# Patient Record
Sex: FEMALE | Race: WHITE | NOT HISPANIC OR LATINO | Employment: FULL TIME | URBAN - METROPOLITAN AREA
[De-identification: names, ages, dates, MRNs, and addresses within clinical notes are randomized per-mention and may not be internally consistent; named-entity substitution may affect disease eponyms.]

---

## 2017-01-11 ENCOUNTER — ALLSCRIPTS OFFICE VISIT (OUTPATIENT)
Dept: OTHER | Facility: OTHER | Age: 57
End: 2017-01-11

## 2017-01-17 ENCOUNTER — ALLSCRIPTS OFFICE VISIT (OUTPATIENT)
Dept: OTHER | Facility: OTHER | Age: 57
End: 2017-01-17

## 2017-02-13 ENCOUNTER — GENERIC CONVERSION - ENCOUNTER (OUTPATIENT)
Dept: OTHER | Facility: OTHER | Age: 57
End: 2017-02-13

## 2017-04-18 ENCOUNTER — ALLSCRIPTS OFFICE VISIT (OUTPATIENT)
Dept: OTHER | Facility: OTHER | Age: 57
End: 2017-04-18

## 2017-05-12 ENCOUNTER — ANESTHESIA (OUTPATIENT)
Dept: GASTROENTEROLOGY | Facility: HOSPITAL | Age: 57
End: 2017-05-12
Payer: COMMERCIAL

## 2017-05-12 ENCOUNTER — HOSPITAL ENCOUNTER (OUTPATIENT)
Facility: HOSPITAL | Age: 57
Setting detail: OUTPATIENT SURGERY
Discharge: HOME | End: 2017-05-12
Attending: INTERNAL MEDICINE | Admitting: INTERNAL MEDICINE
Payer: COMMERCIAL

## 2017-05-12 ENCOUNTER — ANESTHESIA EVENT (OUTPATIENT)
Dept: GASTROENTEROLOGY | Facility: HOSPITAL | Age: 57
End: 2017-05-12
Payer: COMMERCIAL

## 2017-05-12 ENCOUNTER — GENERIC CONVERSION - ENCOUNTER (OUTPATIENT)
Dept: OTHER | Facility: OTHER | Age: 57
End: 2017-05-12

## 2017-05-12 VITALS
WEIGHT: 180 LBS | HEART RATE: 79 BPM | BODY MASS INDEX: 33.13 KG/M2 | SYSTOLIC BLOOD PRESSURE: 126 MMHG | HEIGHT: 62 IN | DIASTOLIC BLOOD PRESSURE: 65 MMHG | RESPIRATION RATE: 16 BRPM | OXYGEN SATURATION: 100 % | TEMPERATURE: 98.1 F

## 2017-05-12 DIAGNOSIS — Z12.12 ENCOUNTER FOR SCREENING FOR MALIGNANT NEOPLASM OF RECTUM: ICD-10-CM

## 2017-05-12 DIAGNOSIS — Z12.11 ENCOUNTER FOR SCREENING FOR MALIGNANT NEOPLASM OF COLON: ICD-10-CM

## 2017-05-12 PROCEDURE — 88305 TISSUE EXAM BY PATHOLOGIST: CPT | Performed by: INTERNAL MEDICINE

## 2017-05-12 RX ORDER — LEVOTHYROXINE SODIUM 175 UG/1
175 TABLET ORAL DAILY
COMMUNITY
End: 2018-02-13 | Stop reason: SDUPTHER

## 2017-05-12 RX ORDER — LIDOCAINE HYDROCHLORIDE 10 MG/ML
INJECTION, SOLUTION INFILTRATION; PERINEURAL AS NEEDED
Status: DISCONTINUED | OUTPATIENT
Start: 2017-05-12 | End: 2017-05-12 | Stop reason: SURG

## 2017-05-12 RX ORDER — SODIUM CHLORIDE 9 MG/ML
INJECTION, SOLUTION INTRAVENOUS CONTINUOUS PRN
Status: DISCONTINUED | OUTPATIENT
Start: 2017-05-12 | End: 2017-05-12 | Stop reason: SURG

## 2017-05-12 RX ORDER — PAROXETINE 7.5 MG/1
CAPSULE ORAL
COMMUNITY
End: 2018-12-21 | Stop reason: ALTCHOICE

## 2017-05-12 RX ORDER — PROPOFOL 10 MG/ML
INJECTION, EMULSION INTRAVENOUS CONTINUOUS PRN
Status: DISCONTINUED | OUTPATIENT
Start: 2017-05-12 | End: 2017-05-12 | Stop reason: SURG

## 2017-05-12 RX ORDER — PROPOFOL 10 MG/ML
INJECTION, EMULSION INTRAVENOUS AS NEEDED
Status: DISCONTINUED | OUTPATIENT
Start: 2017-05-12 | End: 2017-05-12 | Stop reason: SURG

## 2017-05-12 RX ORDER — SODIUM CHLORIDE 9 MG/ML
50 INJECTION, SOLUTION INTRAVENOUS CONTINUOUS
Status: DISCONTINUED | OUTPATIENT
Start: 2017-05-12 | End: 2017-05-12 | Stop reason: HOSPADM

## 2017-05-12 RX ADMIN — PROPOFOL 100 MCG/KG/MIN: 10 INJECTION, EMULSION INTRAVENOUS at 08:07

## 2017-05-12 RX ADMIN — PROPOFOL 80 MG: 10 INJECTION, EMULSION INTRAVENOUS at 08:07

## 2017-05-12 RX ADMIN — SODIUM CHLORIDE: 0.9 INJECTION, SOLUTION INTRAVENOUS at 07:53

## 2017-05-12 RX ADMIN — LIDOCAINE HYDROCHLORIDE 50 MG: 10 INJECTION, SOLUTION INFILTRATION; PERINEURAL at 08:07

## 2017-06-03 ENCOUNTER — GENERIC CONVERSION - ENCOUNTER (OUTPATIENT)
Dept: OTHER | Facility: OTHER | Age: 57
End: 2017-06-03

## 2017-08-08 ENCOUNTER — APPOINTMENT (OUTPATIENT)
Dept: RADIOLOGY | Age: 57
End: 2017-08-08
Attending: PHYSICIAN ASSISTANT
Payer: OTHER MISCELLANEOUS

## 2017-08-08 ENCOUNTER — TRANSCRIBE ORDERS (OUTPATIENT)
Dept: URGENT CARE | Age: 57
End: 2017-08-08

## 2017-08-08 ENCOUNTER — APPOINTMENT (OUTPATIENT)
Dept: URGENT CARE | Age: 57
End: 2017-08-08
Payer: OTHER MISCELLANEOUS

## 2017-08-08 DIAGNOSIS — T14.90XA INJURY: ICD-10-CM

## 2017-08-08 DIAGNOSIS — T14.90XA INJURY: Primary | ICD-10-CM

## 2017-08-08 PROCEDURE — G0383 LEV 4 HOSP TYPE B ED VISIT: HCPCS | Performed by: PREVENTIVE MEDICINE

## 2017-08-08 PROCEDURE — 73030 X-RAY EXAM OF SHOULDER: CPT

## 2017-08-08 PROCEDURE — 99284 EMERGENCY DEPT VISIT MOD MDM: CPT | Performed by: PREVENTIVE MEDICINE

## 2017-08-11 ENCOUNTER — APPOINTMENT (OUTPATIENT)
Dept: URGENT CARE | Age: 57
End: 2017-08-11
Payer: OTHER MISCELLANEOUS

## 2017-08-11 PROCEDURE — 99213 OFFICE O/P EST LOW 20 MIN: CPT | Performed by: PREVENTIVE MEDICINE

## 2017-08-18 ENCOUNTER — APPOINTMENT (OUTPATIENT)
Dept: URGENT CARE | Age: 57
End: 2017-08-18
Payer: OTHER MISCELLANEOUS

## 2017-08-18 PROCEDURE — 99213 OFFICE O/P EST LOW 20 MIN: CPT | Performed by: PREVENTIVE MEDICINE

## 2017-09-15 ENCOUNTER — APPOINTMENT (OUTPATIENT)
Dept: PHYSICAL THERAPY | Facility: CLINIC | Age: 57
End: 2017-09-15
Payer: COMMERCIAL

## 2017-09-15 PROCEDURE — G8990 OTHER PT/OT CURRENT STATUS: HCPCS

## 2017-09-15 PROCEDURE — G8991 OTHER PT/OT GOAL STATUS: HCPCS

## 2017-09-15 PROCEDURE — 97162 PT EVAL MOD COMPLEX 30 MIN: CPT

## 2017-09-18 ENCOUNTER — APPOINTMENT (OUTPATIENT)
Dept: PHYSICAL THERAPY | Facility: CLINIC | Age: 57
End: 2017-09-18
Payer: COMMERCIAL

## 2017-09-18 PROCEDURE — 97140 MANUAL THERAPY 1/> REGIONS: CPT

## 2017-09-21 ENCOUNTER — APPOINTMENT (OUTPATIENT)
Dept: PHYSICAL THERAPY | Facility: CLINIC | Age: 57
End: 2017-09-21
Payer: COMMERCIAL

## 2017-09-21 PROCEDURE — 97140 MANUAL THERAPY 1/> REGIONS: CPT

## 2017-09-26 ENCOUNTER — APPOINTMENT (OUTPATIENT)
Dept: PHYSICAL THERAPY | Facility: CLINIC | Age: 57
End: 2017-09-26
Payer: COMMERCIAL

## 2017-09-26 PROCEDURE — 97014 ELECTRIC STIMULATION THERAPY: CPT

## 2017-09-26 PROCEDURE — 97140 MANUAL THERAPY 1/> REGIONS: CPT

## 2017-09-28 ENCOUNTER — APPOINTMENT (OUTPATIENT)
Dept: PHYSICAL THERAPY | Facility: CLINIC | Age: 57
End: 2017-09-28
Payer: COMMERCIAL

## 2017-09-28 PROCEDURE — 97010 HOT OR COLD PACKS THERAPY: CPT

## 2017-09-28 PROCEDURE — 97140 MANUAL THERAPY 1/> REGIONS: CPT

## 2017-10-03 ENCOUNTER — APPOINTMENT (OUTPATIENT)
Dept: PHYSICAL THERAPY | Facility: CLINIC | Age: 57
End: 2017-10-03
Payer: COMMERCIAL

## 2017-10-03 PROCEDURE — 97140 MANUAL THERAPY 1/> REGIONS: CPT

## 2017-10-05 ENCOUNTER — APPOINTMENT (OUTPATIENT)
Dept: PHYSICAL THERAPY | Facility: CLINIC | Age: 57
End: 2017-10-05
Payer: COMMERCIAL

## 2017-10-05 PROCEDURE — 97140 MANUAL THERAPY 1/> REGIONS: CPT

## 2017-10-11 ENCOUNTER — APPOINTMENT (OUTPATIENT)
Dept: PHYSICAL THERAPY | Facility: CLINIC | Age: 57
End: 2017-10-11
Payer: COMMERCIAL

## 2017-10-11 PROCEDURE — 97140 MANUAL THERAPY 1/> REGIONS: CPT

## 2017-10-13 ENCOUNTER — APPOINTMENT (OUTPATIENT)
Dept: PHYSICAL THERAPY | Facility: CLINIC | Age: 57
End: 2017-10-13
Payer: COMMERCIAL

## 2017-10-13 PROCEDURE — 97110 THERAPEUTIC EXERCISES: CPT

## 2017-10-13 PROCEDURE — 97140 MANUAL THERAPY 1/> REGIONS: CPT

## 2017-10-16 ENCOUNTER — APPOINTMENT (OUTPATIENT)
Dept: PHYSICAL THERAPY | Facility: CLINIC | Age: 57
End: 2017-10-16
Payer: COMMERCIAL

## 2017-10-16 PROCEDURE — 97110 THERAPEUTIC EXERCISES: CPT

## 2017-10-16 PROCEDURE — G8990 OTHER PT/OT CURRENT STATUS: HCPCS

## 2017-10-16 PROCEDURE — 97140 MANUAL THERAPY 1/> REGIONS: CPT

## 2017-10-16 PROCEDURE — G8991 OTHER PT/OT GOAL STATUS: HCPCS

## 2017-10-19 ENCOUNTER — APPOINTMENT (OUTPATIENT)
Dept: PHYSICAL THERAPY | Facility: CLINIC | Age: 57
End: 2017-10-19
Payer: COMMERCIAL

## 2017-10-19 PROCEDURE — 97110 THERAPEUTIC EXERCISES: CPT

## 2017-10-19 PROCEDURE — 97140 MANUAL THERAPY 1/> REGIONS: CPT

## 2017-10-23 ENCOUNTER — APPOINTMENT (OUTPATIENT)
Dept: PHYSICAL THERAPY | Facility: CLINIC | Age: 57
End: 2017-10-23
Payer: COMMERCIAL

## 2017-10-23 PROCEDURE — 97140 MANUAL THERAPY 1/> REGIONS: CPT

## 2017-10-23 PROCEDURE — 97110 THERAPEUTIC EXERCISES: CPT

## 2017-10-26 ENCOUNTER — APPOINTMENT (OUTPATIENT)
Dept: PHYSICAL THERAPY | Facility: CLINIC | Age: 57
End: 2017-10-26
Payer: COMMERCIAL

## 2017-10-26 PROCEDURE — 97140 MANUAL THERAPY 1/> REGIONS: CPT

## 2017-10-26 PROCEDURE — 97110 THERAPEUTIC EXERCISES: CPT

## 2017-10-31 ENCOUNTER — APPOINTMENT (OUTPATIENT)
Dept: PHYSICAL THERAPY | Facility: CLINIC | Age: 57
End: 2017-10-31
Payer: COMMERCIAL

## 2017-10-31 PROCEDURE — 97110 THERAPEUTIC EXERCISES: CPT

## 2017-10-31 PROCEDURE — 97140 MANUAL THERAPY 1/> REGIONS: CPT

## 2017-11-02 ENCOUNTER — APPOINTMENT (OUTPATIENT)
Dept: PHYSICAL THERAPY | Facility: CLINIC | Age: 57
End: 2017-11-02
Payer: COMMERCIAL

## 2017-11-02 PROCEDURE — 97110 THERAPEUTIC EXERCISES: CPT

## 2017-11-02 PROCEDURE — 97140 MANUAL THERAPY 1/> REGIONS: CPT

## 2017-11-06 ENCOUNTER — APPOINTMENT (OUTPATIENT)
Dept: PHYSICAL THERAPY | Facility: CLINIC | Age: 57
End: 2017-11-06
Payer: COMMERCIAL

## 2017-11-06 PROCEDURE — 97140 MANUAL THERAPY 1/> REGIONS: CPT

## 2017-11-06 PROCEDURE — 97110 THERAPEUTIC EXERCISES: CPT

## 2017-11-09 ENCOUNTER — APPOINTMENT (OUTPATIENT)
Dept: PHYSICAL THERAPY | Facility: CLINIC | Age: 57
End: 2017-11-09
Payer: COMMERCIAL

## 2017-11-09 PROCEDURE — 97110 THERAPEUTIC EXERCISES: CPT

## 2017-11-09 PROCEDURE — 97140 MANUAL THERAPY 1/> REGIONS: CPT

## 2017-11-13 ENCOUNTER — APPOINTMENT (OUTPATIENT)
Dept: PHYSICAL THERAPY | Facility: CLINIC | Age: 57
End: 2017-11-13
Payer: COMMERCIAL

## 2017-11-13 PROCEDURE — 97140 MANUAL THERAPY 1/> REGIONS: CPT

## 2017-11-13 PROCEDURE — 97110 THERAPEUTIC EXERCISES: CPT

## 2017-11-16 ENCOUNTER — APPOINTMENT (OUTPATIENT)
Dept: PHYSICAL THERAPY | Facility: CLINIC | Age: 57
End: 2017-11-16
Payer: COMMERCIAL

## 2017-11-16 PROCEDURE — 97110 THERAPEUTIC EXERCISES: CPT

## 2017-11-16 PROCEDURE — 97140 MANUAL THERAPY 1/> REGIONS: CPT

## 2017-11-17 ENCOUNTER — GENERIC CONVERSION - ENCOUNTER (OUTPATIENT)
Dept: OTHER | Facility: OTHER | Age: 57
End: 2017-11-17

## 2017-11-17 DIAGNOSIS — Z12.31 ENCOUNTER FOR SCREENING MAMMOGRAM FOR MALIGNANT NEOPLASM OF BREAST: ICD-10-CM

## 2017-11-20 ENCOUNTER — APPOINTMENT (OUTPATIENT)
Dept: PHYSICAL THERAPY | Facility: CLINIC | Age: 57
End: 2017-11-20
Payer: COMMERCIAL

## 2017-11-20 PROCEDURE — 97110 THERAPEUTIC EXERCISES: CPT

## 2017-11-20 PROCEDURE — 97140 MANUAL THERAPY 1/> REGIONS: CPT

## 2017-11-20 PROCEDURE — G8991 OTHER PT/OT GOAL STATUS: HCPCS

## 2017-11-20 PROCEDURE — G8990 OTHER PT/OT CURRENT STATUS: HCPCS

## 2017-11-21 ENCOUNTER — APPOINTMENT (OUTPATIENT)
Dept: PHYSICAL THERAPY | Facility: CLINIC | Age: 57
End: 2017-11-21
Payer: COMMERCIAL

## 2017-11-22 ENCOUNTER — HOSPITAL ENCOUNTER (OUTPATIENT)
Dept: MAMMOGRAPHY | Facility: HOSPITAL | Age: 57
Discharge: HOME | End: 2017-11-22
Payer: COMMERCIAL

## 2017-11-22 DIAGNOSIS — Z12.31 ENCOUNTER FOR SCREENING MAMMOGRAM FOR MALIGNANT NEOPLASM OF BREAST: ICD-10-CM

## 2017-11-22 PROCEDURE — G0202 SCR MAMMO BI INCL CAD: HCPCS

## 2017-11-24 ENCOUNTER — APPOINTMENT (OUTPATIENT)
Dept: PHYSICAL THERAPY | Facility: CLINIC | Age: 57
End: 2017-11-24
Payer: COMMERCIAL

## 2017-11-24 PROCEDURE — 97140 MANUAL THERAPY 1/> REGIONS: CPT

## 2017-11-24 PROCEDURE — 97110 THERAPEUTIC EXERCISES: CPT

## 2017-11-28 ENCOUNTER — APPOINTMENT (OUTPATIENT)
Dept: PHYSICAL THERAPY | Facility: CLINIC | Age: 57
End: 2017-11-28
Payer: COMMERCIAL

## 2017-11-28 PROCEDURE — 97110 THERAPEUTIC EXERCISES: CPT

## 2017-11-28 PROCEDURE — 97140 MANUAL THERAPY 1/> REGIONS: CPT

## 2017-11-30 ENCOUNTER — APPOINTMENT (OUTPATIENT)
Dept: PHYSICAL THERAPY | Facility: CLINIC | Age: 57
End: 2017-11-30
Payer: COMMERCIAL

## 2017-11-30 PROCEDURE — 97140 MANUAL THERAPY 1/> REGIONS: CPT

## 2017-11-30 PROCEDURE — 97110 THERAPEUTIC EXERCISES: CPT

## 2017-12-04 ENCOUNTER — APPOINTMENT (OUTPATIENT)
Dept: PHYSICAL THERAPY | Facility: CLINIC | Age: 57
End: 2017-12-04
Payer: COMMERCIAL

## 2017-12-04 PROCEDURE — 97140 MANUAL THERAPY 1/> REGIONS: CPT

## 2017-12-04 PROCEDURE — 97110 THERAPEUTIC EXERCISES: CPT

## 2017-12-06 ENCOUNTER — APPOINTMENT (OUTPATIENT)
Dept: PHYSICAL THERAPY | Facility: CLINIC | Age: 57
End: 2017-12-06
Payer: COMMERCIAL

## 2017-12-12 ENCOUNTER — APPOINTMENT (OUTPATIENT)
Dept: PHYSICAL THERAPY | Facility: CLINIC | Age: 57
End: 2017-12-12
Payer: COMMERCIAL

## 2017-12-12 PROCEDURE — 97140 MANUAL THERAPY 1/> REGIONS: CPT

## 2017-12-12 PROCEDURE — 97110 THERAPEUTIC EXERCISES: CPT

## 2017-12-19 ENCOUNTER — APPOINTMENT (OUTPATIENT)
Dept: PHYSICAL THERAPY | Facility: CLINIC | Age: 57
End: 2017-12-19
Payer: COMMERCIAL

## 2017-12-19 PROCEDURE — 97110 THERAPEUTIC EXERCISES: CPT

## 2017-12-19 PROCEDURE — 97140 MANUAL THERAPY 1/> REGIONS: CPT

## 2017-12-26 ENCOUNTER — APPOINTMENT (OUTPATIENT)
Dept: PHYSICAL THERAPY | Facility: CLINIC | Age: 57
End: 2017-12-26
Payer: COMMERCIAL

## 2017-12-26 PROCEDURE — 97140 MANUAL THERAPY 1/> REGIONS: CPT

## 2017-12-26 PROCEDURE — G8991 OTHER PT/OT GOAL STATUS: HCPCS

## 2017-12-26 PROCEDURE — 97110 THERAPEUTIC EXERCISES: CPT

## 2017-12-26 PROCEDURE — G8992 OTHER PT/OT  D/C STATUS: HCPCS

## 2018-01-10 NOTE — RESULT NOTES
Discussion/Summary   Polyps were benign colon polyps but precancerous  He will need a repeat colonoscopy in 5 years  Verified Results  (1) TISSUE EXAM [id number] 08:19AM Maday Christy     Test Name Result Flag Reference   LAB AP CASE REPORT (Report)     Surgical Pathology Report             Case: [id number]                   Authorizing Provider: Josie Charles MD       Collected:      05/12/2017 0819        Ordering Location:   [address] [place] [address]   Received:      05/12/2017 [address] [facility]                               Pathologist:      Kae Hickman DO                               Specimens:  A) - Polyp, Colorectal, Ascending colon polyp                             B) - Polyp, Colorectal, Sigmoid hot snare   LAB AP FINAL DIAGNOSIS (Report)     A  Colon, ascending polyp, biopsy:  - Fragments of sessile serrated polyp   - Negative for high-grade dysplasia  B  Colon, sigmoid polyp, biopsy:  - Tubular adenoma  - Negative for high-grade dysplasia  Interpretation performed at [facility]    Electronically signed by Kae Hickman DO on 5/16/2017 at 11:14 AM   LAB AP SURGICAL ADDITIONAL INFORMATION (Report)     These tests were developed and their performance characteristics   determined by [facility] or [facility]  They may not be cleared or approved by the [place]  Food and   Drug Administration  The FDA has determined that such clearance or   approval is not necessary  These tests are used for clinical purposes  They should not be regarded as investigational or for research  This   laboratory has been approved by CLIA 88, designated as a high-complexity   laboratory and is qualified to perform these tests  LAB AP GROSS DESCRIPTION (Report)     A  The specimen is received in formalin, labeled with the patient's name   and hospital number, and is designated ascending colon polyp   The   specimen consists of 4 tan to pink soft tissue fragments ranging in   greatest dimension from 0 2 to 0 4 centimeters  Entirely submitted  One   cassette  B  The specimen is received in formalin, labeled with the patient's name   and hospital number, and is designated polyp sigmoid  The specimen   consists of a single tan soft tissue fragment measuring 0 4 centimeters in   greatest dimension  Entirely submitted  One cassette  Note: The estimated total formalin fixation time based upon information   provided by the submitting clinician and the standard processing schedule   is 20 25 hours      BMV

## 2018-01-13 VITALS
SYSTOLIC BLOOD PRESSURE: 124 MMHG | TEMPERATURE: 98.5 F | WEIGHT: 177.38 LBS | DIASTOLIC BLOOD PRESSURE: 74 MMHG | HEIGHT: 62 IN | BODY MASS INDEX: 32.64 KG/M2

## 2018-01-13 VITALS
HEIGHT: 62 IN | OXYGEN SATURATION: 98 % | TEMPERATURE: 97.9 F | HEART RATE: 93 BPM | BODY MASS INDEX: 32.96 KG/M2 | WEIGHT: 179.13 LBS

## 2018-01-13 VITALS
WEIGHT: 180.25 LBS | BODY MASS INDEX: 33.17 KG/M2 | SYSTOLIC BLOOD PRESSURE: 126 MMHG | HEIGHT: 62 IN | DIASTOLIC BLOOD PRESSURE: 84 MMHG

## 2018-01-22 VITALS
BODY MASS INDEX: 34.3 KG/M2 | SYSTOLIC BLOOD PRESSURE: 124 MMHG | DIASTOLIC BLOOD PRESSURE: 78 MMHG | HEIGHT: 62 IN | WEIGHT: 186.38 LBS

## 2018-02-13 DIAGNOSIS — E03.9 ACQUIRED HYPOTHYROIDISM: Primary | ICD-10-CM

## 2018-02-13 RX ORDER — LEVOTHYROXINE SODIUM 0.15 MG/1
1 TABLET ORAL DAILY
COMMUNITY
Start: 2014-03-03 | End: 2018-02-13 | Stop reason: SDUPTHER

## 2018-02-14 RX ORDER — LEVOTHYROXINE SODIUM 0.15 MG/1
150 TABLET ORAL DAILY
Qty: 90 TABLET | Refills: 0 | Status: SHIPPED | OUTPATIENT
Start: 2018-02-14 | End: 2018-08-27 | Stop reason: SDUPTHER

## 2018-02-14 RX ORDER — LEVOTHYROXINE SODIUM 175 UG/1
175 TABLET ORAL DAILY
Qty: 90 TABLET | Refills: 0 | Status: SHIPPED | OUTPATIENT
Start: 2018-02-14 | End: 2018-08-27 | Stop reason: SDUPTHER

## 2018-03-09 ENCOUNTER — OFFICE VISIT (OUTPATIENT)
Dept: OBGYN CLINIC | Facility: MEDICAL CENTER | Age: 58
End: 2018-03-09
Payer: COMMERCIAL

## 2018-03-09 VITALS
SYSTOLIC BLOOD PRESSURE: 122 MMHG | BODY MASS INDEX: 33.93 KG/M2 | DIASTOLIC BLOOD PRESSURE: 78 MMHG | WEIGHT: 184.4 LBS | HEIGHT: 62 IN

## 2018-03-09 DIAGNOSIS — N95.0 PMB (POSTMENOPAUSAL BLEEDING): Primary | ICD-10-CM

## 2018-03-09 DIAGNOSIS — N93.8 DUB (DYSFUNCTIONAL UTERINE BLEEDING): ICD-10-CM

## 2018-03-09 PROBLEM — R73.9 HYPERGLYCEMIA: Status: ACTIVE | Noted: 2017-04-19

## 2018-03-09 LAB
SL AMB  POCT GLUCOSE, UA: NEGATIVE
SL AMB LEUKOCYTE ESTERASE,UA: NEGATIVE
SL AMB POCT BILIRUBIN,UA: NEGATIVE
SL AMB POCT BLOOD,UA: NEGATIVE
SL AMB POCT CLARITY,UA: CLEAR
SL AMB POCT COLOR,UA: NORMAL
SL AMB POCT KETONES,UA: NEGATIVE
SL AMB POCT NITRITE,UA: NEGATIVE
SL AMB POCT PH,UA: 6
SL AMB POCT SPECIFIC GRAVITY,UA: 1.02
SL AMB POCT URINE PROTEIN: NEGATIVE
SL AMB POCT UROBILINOGEN: NEGATIVE

## 2018-03-09 PROCEDURE — 81002 URINALYSIS NONAUTO W/O SCOPE: CPT | Performed by: OBSTETRICS & GYNECOLOGY

## 2018-03-09 PROCEDURE — 99214 OFFICE O/P EST MOD 30 MIN: CPT | Performed by: OBSTETRICS & GYNECOLOGY

## 2018-03-09 NOTE — PROGRESS NOTES
Assessment/Plan     Grecia Smith was seen today for vaginal bleeding  Diagnoses and all orders for this visit:    PMB (postmenopausal bleeding)  -     US pelvis complete w transvaginal; Future  -     POCT urine dip    DUB (dysfunctional uterine bleeding)  -     Cancel: POCT hemoglobin fingerstick    Other orders  -     Cancel: US pelvis complete w transvaginal; Future      Discussion/Summary; pt  Is 2 5 years postmenopausal;history of previous endocervical polyp removal and has had polyps during a colonoscopy Rx given for pelvic u s  And asked to return to office in 2 weeks for endometrial biopsy; will heed pap and u s  results  1  Reviewed management options: options based o  Results of u s, pap and EMB if needed  Obey Lemus is an 65 y o  woman who presents for irregular menses  She had been bleeding irregularly  She is now bleeding for 5 days as postmenopausal spotting since last week  days and menses are lasting 5 days  She changes her pad or tampon every 1 hours  Dysmenorrhea:none  Cyclic symptoms include: none  Current contraception: none   History of infertility: no      Patient Active Problem List   Diagnosis    Hyperglycemia    Hyperlipidemia    Hypothyroidism    Menopausal hot flushes    Cervicitis and endocervicitis    Pain, joint, shoulder       Past Medical History:   Diagnosis Date    Abnormal glucose     last assessed: 09/08/16    Abnormal weight gain     last assessed: 07/10/13    Candidal vulvovaginitis     last assessed: 03/11/14    Cervical polyp     last assessed: 03/11/14    Disease of thyroid gland     hypothyroidism     Hot flashes due to menopause     Spontaneous vaginal delivery     history of two vaginal deliveries    Vitamin D deficiency     last assessed: 09/08/16       Past Surgical History:   Procedure Laterality Date    DENTAL SURGERY      FOOT SURGERY Right 2002    bunion    OH COLONOSCOPY FLX DX W/COLLJ SPEC WHEN PFRMD N/A 5/12/2017    Procedure: COLONOSCOPY;  Surgeon: Lizbet May MD;  Location: [facility]; Service: Gastroenterology    ROTATOR CUFF REPAIR  2017    [facility]    TUBAL LIGATION         Family History   Problem Relation Age of Onset    Hypertension Mother      benign essential    Hypertension Father      benign essential        Social History     Social History    Marital status: /Civil Union     Spouse name: N/A    Number of children: N/A    Years of education: N/A     Occupational History    Not on file  Social History Main Topics    Smoking status: Former Smoker    Smokeless tobacco: Never Used    Alcohol use Yes      Comment: rare    Drug use: No    Sexual activity: Not on file     Other Topics Concern    Not on file     Social History Narrative    No narrative on file         Current Outpatient Prescriptions:     levothyroxine 150 mcg tablet, Take 1 tablet (150 mcg total) by mouth daily, Disp: 90 tablet, Rfl: 0    levothyroxine 175 mcg tablet, Take 1 tablet (175 mcg total) by mouth daily, Disp: 90 tablet, Rfl: 0    PARoxetine Mesylate (BRISDELLE) 7 5 MG CAPS, Take by mouth, Disp: , Rfl:     Allergies   Allergen Reactions    Codeine Other (See Comments), GI Intolerance and Vomiting     Headache      Latex Other (See Comments) and GI Intolerance     Gagging       Review of Systems  Constitutional :no fever, feels well, no tiredness, no recent weight gain or loss  ENT: no ear ache, no loss of hearing, no nosebleeds or nasal discharge, no sore throat or hoarseness  Cardiovascular: no complaints of slow or fast heart beat, no chest pain, no palpitations, no leg claudication or lower extremity edema    Respiratory: no complaints of shortness of shortness of breath, no SMITH  Breasts:no complaints of breast pain, breast lump, or nipple discharge  Gastrointestinal: no complaints of abdominal pain, constipation,nausea, vomiting, or diarrhea or bloody stools  Genitourinary : no complaints of dysuria, incontinence, pelvic pain, no dysmenorrhea, vaginal discharge or abnormal vaginal bleeding and as noted in HPI  Integumentary: no complaints of skin rash or lesion, itching or dry skin  Neurological: no complaints of headache, no confusion, no numbness or tingling, no dizziness or fainting    Objective    /78   Ht 5' 2" (1 575 m)   Wt 83 6 kg (184 lb 6 4 oz)   LMP 02/28/2018   Breastfeeding?  No   BMI 33 73 kg/m²       General:   appears stated age, cooperative, alert normal mood and affect   Neck: Neck: normal, supple,trachea midline, no masses   Heart: regular rate and rhythm, S1, S2 normal, no murmur, click, rub or gallop   Lungs: clear to auscultation bilaterally   Breasts: Breast exam :normal, no dimpling or skin changes noted   Abdomen: soft, non-tender, without masses or organomegaly   Vulva: Normal , no lesiona   Vagina: normal , no lesions or dryness   Urethra: normal   Cervix: Normal, no palpable masses ; positive small amount of tissue at cervix with bright red spotting; ec and c pap done as precaution   Uterus: Normal , non-tender,not enlarged,no palpable masses   Adnexa: Normal, non-tender without fullness or masses

## 2018-03-13 ENCOUNTER — HOSPITAL ENCOUNTER (OUTPATIENT)
Dept: ULTRASOUND IMAGING | Facility: MEDICAL CENTER | Age: 58
Discharge: HOME | End: 2018-03-13
Payer: COMMERCIAL

## 2018-03-13 ENCOUNTER — TRANSCRIBE ORDERS (OUTPATIENT)
Dept: ADMINISTRATIVE | Facility: HOSPITAL | Age: 58
End: 2018-03-13

## 2018-03-13 DIAGNOSIS — N95.0 PMB (POSTMENOPAUSAL BLEEDING): ICD-10-CM

## 2018-03-13 PROCEDURE — 76856 US EXAM PELVIC COMPLETE: CPT

## 2018-03-13 PROCEDURE — 76830 TRANSVAGINAL US NON-OB: CPT

## 2018-03-14 LAB
CYTOLOGIST CVX/VAG CYTO: NORMAL
DX ICD CODE: NORMAL
Lab: NORMAL
OTHER STN SPEC: NORMAL
OTHER STN SPEC: NORMAL
PATH REPORT.FINAL DX SPEC: NORMAL
SL AMB NOTE:: NORMAL
SL AMB SPECIMEN ADEQUACY: NORMAL
SL AMB TEST METHODOLOGY: NORMAL

## 2018-03-22 ENCOUNTER — PROCEDURE VISIT (OUTPATIENT)
Dept: OBGYN CLINIC | Facility: MEDICAL CENTER | Age: 58
End: 2018-03-22
Payer: COMMERCIAL

## 2018-03-22 VITALS
WEIGHT: 184 LBS | DIASTOLIC BLOOD PRESSURE: 76 MMHG | HEIGHT: 62 IN | BODY MASS INDEX: 33.86 KG/M2 | SYSTOLIC BLOOD PRESSURE: 118 MMHG

## 2018-03-22 DIAGNOSIS — N95.0 POSTMENOPAUSAL BLEEDING: Primary | ICD-10-CM

## 2018-03-22 PROCEDURE — 58100 BIOPSY OF UTERUS LINING: CPT | Performed by: OBSTETRICS & GYNECOLOGY

## 2018-03-22 NOTE — PROGRESS NOTES
Endometrial biopsy  Date/Time: 3/22/2018 4:07 PM  Performed by: Tina Garrison  Authorized by: Tina Garrison     Consent:     Consent obtained:  Verbal and written    Consent given by:  Patient    Procedural risks discussed:  Bleeding, possible continued pain, damage to other organs, possible conversion to open surgery, possible loss of function, failure rate, repeat procedure and infection    Patient questions answered: yes      Patient agrees, verbalizes understanding, and wants to proceed: yes      Educational handouts given: no      Instructions and paperwork completed: yes    Indication:     Indications: Post-menopausal bleeding      Chronicity of post-menopausal bleeding:  New    Progression of post-menopausal bleeding:  Unchanged  Pre-procedure:     Pre-procedure timeout performed: yes      Premeds:  Ibuprofen  Procedure:     Procedure: endometrial biopsy with Pipelle      A bivalve speculum was placed in the vagina: yes      Cervix cleaned and prepped: yes      A paracervical block was performed: no      An intracervical block was performed: no      The cervix was dilated: yes      Uterus sounded: yes      Uterus sound depth (cm):  6 5    Specimen collected: specimen collected and sent to pathology      Patient tolerated procedure well with no complications: yes    Findings:     Uterus size:  6-8 weeks    Cervix: normal      Adnexa: normal    Comments:      Procedure assisted by Curtis Baron CMA and cruz Swanson  StudentMiguelangel; procedure tolerated well; RTO 2 weeks, but will call her when pathology report becomes available

## 2018-03-30 ENCOUNTER — OFFICE VISIT (OUTPATIENT)
Dept: FAMILY MEDICINE CLINIC | Facility: CLINIC | Age: 58
End: 2018-03-30
Payer: COMMERCIAL

## 2018-03-30 VITALS
DIASTOLIC BLOOD PRESSURE: 88 MMHG | HEIGHT: 63 IN | SYSTOLIC BLOOD PRESSURE: 142 MMHG | BODY MASS INDEX: 32.5 KG/M2 | WEIGHT: 183.4 LBS

## 2018-03-30 DIAGNOSIS — J02.9 SORE THROAT: ICD-10-CM

## 2018-03-30 DIAGNOSIS — R05.9 COUGH: ICD-10-CM

## 2018-03-30 DIAGNOSIS — J11.1 INFLUENZA: Primary | ICD-10-CM

## 2018-03-30 PROCEDURE — 99213 OFFICE O/P EST LOW 20 MIN: CPT | Performed by: FAMILY MEDICINE

## 2018-03-30 RX ORDER — OSELTAMIVIR PHOSPHATE 75 MG/1
75 CAPSULE ORAL EVERY 12 HOURS SCHEDULED
Qty: 10 CAPSULE | Refills: 0 | Status: SHIPPED | OUTPATIENT
Start: 2018-03-30 | End: 2018-04-04

## 2018-03-30 RX ORDER — AZITHROMYCIN 250 MG/1
TABLET, FILM COATED ORAL
Qty: 6 TABLET | Refills: 0 | Status: SHIPPED | OUTPATIENT
Start: 2018-03-30 | End: 2018-04-04

## 2018-03-30 NOTE — PATIENT INSTRUCTIONS
Rest and fluids, start abx and Tamiflu and Dimetapp DM cold and cough  Tylenol or advil for pain or fever  Call if worse

## 2018-03-30 NOTE — PROGRESS NOTES
Assessment/Plan:  Chief Complaint   Patient presents with    Sore Throat     started yesterday  Clear mucus    Chills     last evening    Headache     Patient Instructions   Rest and fluids, start abx and Tamiflu and Dimetapp DM cold and cough  Tylenol or advil for pain or fever  Call if worse  No problem-specific Assessment & Plan notes found for this encounter  Diagnoses and all orders for this visit:    Influenza    Cough    Sore throat          Subjective:      Patient ID: Bill Cox is a 65 y o  female  Here for flu like illness, has chills and headache and is hard to swallow and phlegm is clear and has headache also and took Benadryl and Delsym and took Tylenol for pain  Works at [employer]  2 patients positive for influenza  The following portions of the patient's history were reviewed and updated as appropriate: allergies, current medications, past medical history and problem list     Review of Systems   Constitutional: Positive for chills  HENT: Positive for sore throat  Eyes: Negative  Respiratory: Positive for cough  Cardiovascular: Negative  Gastrointestinal: Negative  Endocrine: Negative  Genitourinary: Negative  Musculoskeletal: Negative  Skin: Negative  Allergic/Immunologic: Negative  Neurological: Positive for headaches  Hematological: Negative  Psychiatric/Behavioral: Negative  Objective:      /88 (BP Location: Left arm, Patient Position: Sitting, Cuff Size: Standard)   Ht 5' 2 5" (1 588 m)   Wt 83 2 kg (183 lb 6 4 oz)   LMP 02/28/2018   BMI 33 01 kg/m²          Physical Exam   Constitutional: She is oriented to person, place, and time  She appears well-developed and well-nourished  HENT:   Head: Normocephalic and atraumatic  Right Ear: External ear normal    Left Ear: External ear normal    PND and rhinorhea   Eyes: Conjunctivae and EOM are normal  Pupils are equal, round, and reactive to light     Neck: Normal range of motion  Neck supple  Cardiovascular: Normal rate, regular rhythm, normal heart sounds and intact distal pulses  Pulmonary/Chest: Effort normal and breath sounds normal    Cough     Musculoskeletal: Normal range of motion  Neurological: She is alert and oriented to person, place, and time  She has normal reflexes  Skin: Skin is warm and dry  Psychiatric: She has a normal mood and affect   Her behavior is normal

## 2018-03-31 LAB
PATH REPORT.SITE OF ORIGIN SPEC: NORMAL
PAYMENT PROCEDURE: NORMAL
SL AMB .: NORMAL

## 2018-05-14 DIAGNOSIS — R73.9 HYPERGLYCEMIA: ICD-10-CM

## 2018-05-14 DIAGNOSIS — E03.9 HYPOTHYROIDISM: ICD-10-CM

## 2018-05-14 DIAGNOSIS — E78.5 HYPERLIPIDEMIA: ICD-10-CM

## 2018-08-27 DIAGNOSIS — E03.9 ACQUIRED HYPOTHYROIDISM: ICD-10-CM

## 2018-08-27 NOTE — TELEPHONE ENCOUNTER
Patient needs a refill of her thyroid medication    Please send to [phone] [facility]

## 2018-08-29 RX ORDER — LEVOTHYROXINE SODIUM 175 UG/1
175 TABLET ORAL DAILY
Qty: 90 TABLET | Refills: 3 | Status: SHIPPED | OUTPATIENT
Start: 2018-08-29 | End: 2020-02-02

## 2018-08-29 RX ORDER — LEVOTHYROXINE SODIUM 0.15 MG/1
150 TABLET ORAL DAILY
Qty: 90 TABLET | Refills: 3 | Status: SHIPPED | OUTPATIENT
Start: 2018-08-29 | End: 2020-05-27

## 2018-08-31 LAB — HBA1C MFR BLD HPLC: 5.9 %

## 2018-09-04 RX ORDER — ZOLPIDEM TARTRATE 5 MG/1
TABLET ORAL
COMMUNITY
End: 2018-12-21 | Stop reason: ALTCHOICE

## 2018-09-06 ENCOUNTER — OFFICE VISIT (OUTPATIENT)
Dept: FAMILY MEDICINE CLINIC | Facility: CLINIC | Age: 58
End: 2018-09-06
Payer: COMMERCIAL

## 2018-09-06 VITALS
WEIGHT: 182.8 LBS | DIASTOLIC BLOOD PRESSURE: 86 MMHG | SYSTOLIC BLOOD PRESSURE: 122 MMHG | BODY MASS INDEX: 33.64 KG/M2 | HEIGHT: 62 IN

## 2018-09-06 DIAGNOSIS — E66.09 CLASS 1 OBESITY DUE TO EXCESS CALORIES WITHOUT SERIOUS COMORBIDITY IN ADULT, UNSPECIFIED BMI: ICD-10-CM

## 2018-09-06 DIAGNOSIS — Z12.31 SCREENING MAMMOGRAM, ENCOUNTER FOR: Primary | ICD-10-CM

## 2018-09-06 DIAGNOSIS — E03.9 ACQUIRED HYPOTHYROIDISM: ICD-10-CM

## 2018-09-06 DIAGNOSIS — R73.9 HYPERGLYCEMIA: Primary | ICD-10-CM

## 2018-09-06 PROCEDURE — 99213 OFFICE O/P EST LOW 20 MIN: CPT | Performed by: FAMILY MEDICINE

## 2018-09-06 PROCEDURE — 3008F BODY MASS INDEX DOCD: CPT | Performed by: FAMILY MEDICINE

## 2018-09-06 PROCEDURE — 1036F TOBACCO NON-USER: CPT | Performed by: FAMILY MEDICINE

## 2018-09-06 NOTE — PROGRESS NOTES
Assessment/Plan:    Patient's A1c is stable at 5 9  Continue risk factor modification  Thyroids are normal range  Recheck thyroids an A1c in 6 months  Mammography ordered  Patient declines flu vaccine  We did explain there is a new egg free flu vaccine which she will research  Diagnoses and all orders for this visit:    Hyperglycemia  -     Hemoglobin A1C; Future    Acquired hypothyroidism  -     TSH, 3rd generation; Future    Class 1 obesity due to excess calories without serious comorbidity in adult, unspecified BMI        There are no Patient Instructions on file for this visit  Subjective:        Patient ID: Javi Wren is a 65 y o  female  Chief Complaint   Patient presents with    Follow-up     thyroid, review labs, pt states that her nails are getting more bridle, moodswings, discuss medications        Patient for 6 month check regarding labs  Overall feels well  Unfortunately her mother passed away in May  She has been under lot of stress most of the beginning of the year  The house will hopefully go on to supplement within the next week or 2  Patient looking for to vacation        The following portions of the patient's history were reviewed and updated as appropriate: past medical history, past surgical history and problem list       Review of Systems   Constitutional: Negative for fatigue and unexpected weight change  Respiratory: Negative for shortness of breath  Cardiovascular: Negative for chest pain and leg swelling  Neurological: Negative for dizziness  Psychiatric/Behavioral: The patient is not nervous/anxious            Objective:  /86 (BP Location: Left arm, Patient Position: Sitting, Cuff Size: Large)   Ht 5' 2" (1 575 m)   Wt 82 9 kg (182 lb 12 8 oz)   BMI 33 43 kg/m²        Physical Exam

## 2018-12-21 ENCOUNTER — OFFICE VISIT (OUTPATIENT)
Dept: FAMILY MEDICINE CLINIC | Facility: CLINIC | Age: 58
End: 2018-12-21
Payer: COMMERCIAL

## 2018-12-21 VITALS
HEIGHT: 62 IN | SYSTOLIC BLOOD PRESSURE: 138 MMHG | BODY MASS INDEX: 33.13 KG/M2 | WEIGHT: 180 LBS | DIASTOLIC BLOOD PRESSURE: 92 MMHG | TEMPERATURE: 98.1 F

## 2018-12-21 DIAGNOSIS — J06.9 ACUTE URI: Primary | ICD-10-CM

## 2018-12-21 PROCEDURE — 99213 OFFICE O/P EST LOW 20 MIN: CPT | Performed by: NURSE PRACTITIONER

## 2018-12-21 RX ORDER — BENZONATATE 100 MG/1
100 CAPSULE ORAL 3 TIMES DAILY PRN
Qty: 20 CAPSULE | Refills: 0 | Status: SHIPPED | OUTPATIENT
Start: 2018-12-21 | End: 2020-01-19

## 2018-12-21 RX ORDER — AZITHROMYCIN 250 MG/1
TABLET, FILM COATED ORAL
Qty: 6 TABLET | Refills: 0 | Status: SHIPPED | OUTPATIENT
Start: 2018-12-21 | End: 2018-12-25

## 2018-12-21 RX ORDER — PREDNISONE 20 MG/1
40 TABLET ORAL DAILY
Qty: 6 TABLET | Refills: 0 | Status: SHIPPED | OUTPATIENT
Start: 2018-12-21 | End: 2018-12-24

## 2018-12-21 NOTE — LETTER
December 21, 2018     Patient: Trang Wolf   YOB: 1960   Date of Visit: 12/21/2018       To Whom it May Concern:    Jose Morales is under my professional care  She was seen in my office on 12/21/2018  She may return to work on 12/21/18  If you have any questions or concerns, please don't hesitate to call           Sincerely,          FREDERICK Dela Cruz        CC: No Recipients

## 2018-12-21 NOTE — PATIENT INSTRUCTIONS
Start prednisone, this is the steroid  40mg daily for 3 days  Take with food  It's better to take it earlier in the day than later as it could keep you up at night  Do not mix with NSAIDs such as aleve, ibuprofen, advil, motrin  Start cough medication, this is the Tessalon perles  One pill every 8 hours as needed for cough  If no improvement or any worsening symptoms over the next 1-2 days, start antibiotic, Azithromycin  This is 2 pills on day one and then 1 pill for the following 4 days  Increase fluids, continue Flonase and zyrtec  Call us if you experience any worsening symptoms or no improvement

## 2018-12-21 NOTE — PROGRESS NOTES
Assessment/Plan:    Acute URI     Patient advised to duration this may still be viral in etiology  Will start prednisone burst 40 mg daily for 3 days  Will start Tessalon Perles for cough every 8 hr as needed  If no improvement over the next 24-48 hours or any worsening symptoms she may start azithromycin antibiotic  Patient advised to increase fluids and can start Flonase  She is to call if any worsening symptoms or no improvement  Note for work provided     Diagnoses and all orders for this visit:    Acute URI  -     benzonatate (TESSALON PERLES) 100 mg capsule; Take 1 capsule (100 mg total) by mouth 3 (three) times a day as needed for cough  -     predniSONE 20 mg tablet; Take 2 tablets (40 mg total) by mouth daily for 3 days  -     azithromycin (ZITHROMAX) 250 mg tablet; Take 2 tablets today then 1 tablet daily x 4 days    Other orders  -     Cholecalciferol (VITAMIN D PO); Take by mouth      Patient verbalizes understand and agrees with treatment plan  Subjective:        Patient ID: Loni Blancas is a 63 y o  female  Chief Complaint   Patient presents with    Sore Throat     started monday; feeling fatigue, cough with phlegm  Patient comes in complaining of cough, congestion, post nasal drip, fatigue, and sinus pressure for 4 days  Patient started taking delsym for the cough last night which helped patient sleep  Patient started taking zyrtec and a nasal spray on Monday with no relief  She reports clear nasal drainage  Per patient cough is most bothersome symptom  The following portions of the patient's history were reviewed and updated as appropriate: allergies, current medications, past family history, past social history and problem list     Review of Systems   Constitutional: Positive for fatigue  Negative for chills and fever  HENT: Positive for congestion, rhinorrhea and sore throat  Eyes: Negative for pain and visual disturbance     Respiratory: Positive for cough  Negative for shortness of breath  Cardiovascular: Negative for chest pain, palpitations and leg swelling  Gastrointestinal: Negative for abdominal pain, diarrhea, nausea and vomiting  Genitourinary: Negative for difficulty urinating and dysuria  Musculoskeletal: Negative for arthralgias and myalgias  Skin: Negative for color change and rash  Allergic/Immunologic: Positive for environmental allergies  Neurological: Negative for dizziness, syncope, numbness and headaches  Hematological: Negative for adenopathy  Psychiatric/Behavioral: Negative for agitation and behavioral problems  The patient is not nervous/anxious  Objective:  /92 (BP Location: Left arm, Patient Position: Sitting, Cuff Size: Large)   Temp 98 1 °F (36 7 °C) (Tympanic)   Ht 5' 2" (1 575 m)   Wt 81 6 kg (180 lb)   BMI 32 92 kg/m²      Physical Exam   Constitutional: She is oriented to person, place, and time  She appears well-developed  No distress  obese   HENT:   Head: Normocephalic and atraumatic  Right Ear: Hearing, tympanic membrane, external ear and ear canal normal  No drainage, swelling or tenderness  Tympanic membrane is not perforated, not erythematous, not retracted and not bulging  No decreased hearing is noted  Left Ear: Hearing, tympanic membrane, external ear and ear canal normal  No drainage, swelling or tenderness  Tympanic membrane is not perforated, not erythematous, not retracted and not bulging  No decreased hearing is noted  Nose: Mucosal edema and rhinorrhea present  Right sinus exhibits frontal sinus tenderness  Left sinus exhibits frontal sinus tenderness  Mouth/Throat: Mucous membranes are pale  Posterior oropharyngeal erythema present  No oropharyngeal exudate  Eyes: Pupils are equal, round, and reactive to light  Conjunctivae and lids are normal  Right eye exhibits no discharge  Left eye exhibits no discharge  Neck: Normal range of motion  Neck supple   No tracheal deviation present  Cardiovascular: Normal rate and regular rhythm  No murmur heard  Pulmonary/Chest: Effort normal and breath sounds normal  No respiratory distress  She has no wheezes  Abdominal: Soft  Bowel sounds are normal  She exhibits no distension  There is no tenderness  There is no guarding  Musculoskeletal: She exhibits no edema or deformity  Lymphadenopathy:     She has no cervical adenopathy  Neurological: She is alert and oriented to person, place, and time  Coordination normal    Skin: Skin is warm and dry  No rash noted  She is not diaphoretic  No erythema  Psychiatric: She has a normal mood and affect  Her speech is normal and behavior is normal  Judgment and thought content normal  Cognition and memory are normal    Nursing note and vitals reviewed

## 2019-01-21 ENCOUNTER — HOSPITAL ENCOUNTER (OUTPATIENT)
Dept: MAMMOGRAPHY | Facility: MEDICAL CENTER | Age: 59
Discharge: HOME | End: 2019-01-21
Payer: COMMERCIAL

## 2019-01-21 VITALS — HEIGHT: 62 IN | WEIGHT: 180 LBS | BODY MASS INDEX: 33.13 KG/M2

## 2019-01-21 DIAGNOSIS — Z12.31 SCREENING MAMMOGRAM, ENCOUNTER FOR: ICD-10-CM

## 2019-01-21 PROCEDURE — 77067 SCR MAMMO BI INCL CAD: CPT

## 2019-05-06 LAB — HBA1C MFR BLD HPLC: 5.9 %

## 2019-05-31 ENCOUNTER — OFFICE VISIT (OUTPATIENT)
Dept: FAMILY MEDICINE CLINIC | Facility: CLINIC | Age: 59
End: 2019-05-31
Payer: COMMERCIAL

## 2019-05-31 VITALS
SYSTOLIC BLOOD PRESSURE: 120 MMHG | HEIGHT: 62 IN | BODY MASS INDEX: 33.79 KG/M2 | WEIGHT: 183.6 LBS | DIASTOLIC BLOOD PRESSURE: 80 MMHG

## 2019-05-31 DIAGNOSIS — L30.9 ECZEMA, UNSPECIFIED TYPE: ICD-10-CM

## 2019-05-31 DIAGNOSIS — E03.9 ACQUIRED HYPOTHYROIDISM: ICD-10-CM

## 2019-05-31 DIAGNOSIS — R53.82 CHRONIC FATIGUE: ICD-10-CM

## 2019-05-31 DIAGNOSIS — R73.9 HYPERGLYCEMIA: ICD-10-CM

## 2019-05-31 DIAGNOSIS — S93.401A MILD SPRAIN OF RIGHT ANKLE, INITIAL ENCOUNTER: ICD-10-CM

## 2019-05-31 DIAGNOSIS — E55.9 VITAMIN D DEFICIENCY: ICD-10-CM

## 2019-05-31 DIAGNOSIS — Z00.00 HEALTH CARE MAINTENANCE: Primary | ICD-10-CM

## 2019-05-31 DIAGNOSIS — E78.01 FAMILIAL HYPERCHOLESTEROLEMIA: ICD-10-CM

## 2019-05-31 PROCEDURE — 99396 PREV VISIT EST AGE 40-64: CPT | Performed by: FAMILY MEDICINE

## 2019-05-31 RX ORDER — MOMETASONE FUROATE 1 MG/G
CREAM TOPICAL DAILY
Qty: 15 G | Refills: 0 | Status: SHIPPED | OUTPATIENT
Start: 2019-05-31 | End: 2020-04-23

## 2019-07-11 ENCOUNTER — ANNUAL EXAM (OUTPATIENT)
Dept: OBGYN CLINIC | Facility: MEDICAL CENTER | Age: 59
End: 2019-07-11
Payer: COMMERCIAL

## 2019-07-11 VITALS
HEIGHT: 62 IN | BODY MASS INDEX: 33.84 KG/M2 | DIASTOLIC BLOOD PRESSURE: 86 MMHG | WEIGHT: 183.9 LBS | SYSTOLIC BLOOD PRESSURE: 120 MMHG

## 2019-07-11 DIAGNOSIS — Z01.419 ENCNTR FOR GYN EXAM (GENERAL) (ROUTINE) W/O ABN FINDINGS: Primary | ICD-10-CM

## 2019-07-11 DIAGNOSIS — Z12.31 ENCOUNTER FOR SCREENING MAMMOGRAM FOR MALIGNANT NEOPLASM OF BREAST: ICD-10-CM

## 2019-07-11 DIAGNOSIS — F41.9 ANXIETY: ICD-10-CM

## 2019-07-11 PROCEDURE — 99396 PREV VISIT EST AGE 40-64: CPT | Performed by: OBSTETRICS & GYNECOLOGY

## 2019-07-11 RX ORDER — FLUOXETINE 10 MG/1
10 CAPSULE ORAL DAILY
Qty: 30 CAPSULE | Refills: 5 | Status: SHIPPED | OUTPATIENT
Start: 2019-07-11 | End: 2019-12-03 | Stop reason: SDUPTHER

## 2019-07-11 NOTE — PROGRESS NOTES
ASSESSMENT & PLAN: Oscar Slade was seen today for gynecologic exam     Diagnoses and all orders for this visit:    Encntr for gyn exam (general) (routine) w/o abn findings    Encounter for screening mammogram for malignant neoplasm of breast  -     Mammo screening bilateral w 3d & cad; Future    Anxiety  -     FLUoxetine (PROzac) 10 mg capsule; Take 1 capsule (10 mg total) by mouth daily    Discussion/Summary: Patient here for yearly gyn preventive exam;mc/o some hot flashes and decreased moods since stopping the [employer]; wants somethinng else; we discussed HRT, but she preferred a mood elevator;I sent an Rx to [facility] for fluoxetine to try  Asked her to give me feedback after at least one month  1   Routine well woman exam done today  2  Pap and HPV:  The patient's pap is up to date  Pap and cotesting was not done today  Current ASCCP Guidelines reviewed  3   Mammogram was ordered  4  Colonoscopy is up to date  4  The following were reviewed in today's visit: adequate intake of calcium and vitamin D, exercise and healthy diet  5  F/u 1 year  CC:  Annual Gynecologic Examination    HPI: Neha Valenzuela is a 63 y o  who presents for annual gynecologic examination  She has the following concerns:  Moods and occasional hot flashes    Health Maintenance:    Patient describes her health as good  Patient has weight concerns  She exercises 7 days per week with work and walking the dog  She does wear her seatbelt routinely  She does perform regular monthly self breast exams  She does feel safe at home  Patients does not follow a  diet  Patient gets 4 servings of dairy or calcium rich foods a day      Last pap: 2017  Last mammogram:2018  Last colonoscopy: 2016    Patient Active Problem List   Diagnosis    Hyperglycemia    Hyperlipidemia    Hypothyroidism    Menopausal hot flushes    Cervicitis and endocervicitis    Pain, joint, shoulder       Past Medical History:   Diagnosis Date    Abnormal glucose     last assessed: 09/08/16    Abnormal weight gain     last assessed: 07/10/13    Candidal vulvovaginitis     last assessed: 03/11/14    Cervical polyp     last assessed: 03/11/14    Disease of thyroid gland     hypothyroidism     Hot flashes due to menopause     Spontaneous vaginal delivery     history of two vaginal deliveries    Vitamin D deficiency     last assessed: 09/08/16       Past Surgical History:   Procedure Laterality Date    DENTAL SURGERY      FOOT SURGERY Right 2002    bunion    ND COLONOSCOPY FLX DX W/COLLJ SPEC WHEN PFRMD N/A 5/12/2017    Procedure: COLONOSCOPY;  Surgeon: Gabe Kahn MD;  Location: [facility]; Service: Gastroenterology    ROTATOR CUFF REPAIR  2017    [facility]    TUBAL LIGATION         Past OB/Gyn History:    History of abnormal pap smears: No    Patient is currently sexually active  monogamous    Patient's family planning method is post menopausal status      Family History   Problem Relation Age of Onset    Hypertension Mother         benign essential    Cancer Mother 66        pacreas related    Hypertension Father         benign essential    Migraines Sister        Social History:  Social History     Socioeconomic History    Marital status: /Civil Union     Spouse name: Not on file    Number of children: Not on file    Years of education: Not on file    Highest education level: Not on file   Occupational History    Not on file   Social Needs    Financial resource strain: Not on file    Food insecurity:     Worry: Not on file     Inability: Not on file    Transportation needs:     Medical: Not on file     Non-medical: Not on file   Tobacco Use    Smoking status: Former Smoker    Smokeless tobacco: Never Used   Substance and Sexual Activity    Alcohol use: Not Currently     Comment: rare    Drug use: No    Sexual activity: Yes     Partners: Male     Birth control/protection: Female Sterilization   Lifestyle    Physical activity:     Days per week: Not on file     Minutes per session: Not on file    Stress: Not on file   Relationships    Social connections:     Talks on phone: Not on file     Gets together: Not on file     Attends Anabaptist service: Not on file     Active member of club or organization: Not on file     Attends meetings of clubs or organizations: Not on file     Relationship status: Not on file    Intimate partner violence:     Fear of current or ex partner: Not on file     Emotionally abused: Not on file     Physically abused: Not on file     Forced sexual activity: Not on file   Other Topics Concern    Not on file   Social History Narrative    Not on file     Presently lives with family  Patient is currently employed   Allergies   Allergen Reactions    Codeine Other (See Comments), GI Intolerance and Vomiting     Headache      Eggs Or Egg-Derived Products     Kiwi Extract      Mouth swelling    Latex Other (See Comments) and GI Intolerance     Gagging         Current Outpatient Medications:     Cholecalciferol (VITAMIN D PO), Take by mouth, Disp: , Rfl:     levothyroxine 150 mcg tablet, Take 1 tablet (150 mcg total) by mouth daily, Disp: 90 tablet, Rfl: 3    levothyroxine 175 mcg tablet, Take 1 tablet (175 mcg total) by mouth daily, Disp: 90 tablet, Rfl: 3    mometasone (ELOCON) 0 1 % cream, Apply topically daily, Disp: 15 g, Rfl: 0    benzonatate (TESSALON PERLES) 100 mg capsule, Take 1 capsule (100 mg total) by mouth 3 (three) times a day as needed for cough (Patient not taking: Reported on 5/31/2019), Disp: 20 capsule, Rfl: 0    FLUoxetine (PROzac) 10 mg capsule, Take 1 capsule (10 mg total) by mouth daily, Disp: 30 capsule, Rfl: 5    Review of Systems  Constitutional :no fever, feels well, no tiredness, no recent weight gain or loss  ENT: no ear ache, no loss of hearing, no nosebleeds or nasal discharge, no sore throat or hoarseness    Cardiovascular: no complaints of slow or fast heart beat, no chest pain, no palpitations, no leg claudication or lower extremity edema  Respiratory: no complaints of shortness of shortness of breath, no SMITH  Breasts:no complaints of breast pain, breast lump, or nipple discharge  Gastrointestinal: no complaints of abdominal pain, constipation, nausea, vomiting, or diarrhea or bloody stools  Genitourinary : no complaints of dysuria, incontinence, pelvic pain, no dysmenorrhea, vaginal discharge or abnormal vaginal bleeding and as noted in HPI  Musculoskeletal: no complaints of arthralgia, no myalgia, no joint swelling or stiffness, no limb pain or swelling  Integumentary: no complaints of skin rash or lesion, itching or dry skin  Neurological: no complaints of headache, no confusion, no numbness or tingling, no dizziness or fainting    Physical Exam:     /86   Ht 5' 1 7" (1 567 m)   Wt 83 4 kg (183 lb 14 4 oz)   LMP 02/28/2018   BMI 33 96 kg/m²     General: appears stated age, cooperative, alert normal mood and affect   Psychiatric oriented to person, place and time  Mood and affect normal   Neck: normal, supple,trachea midline, no masses  Thyroid: normal, no thyromegaly   Heart: regular rate and rhythm, S1, S2 normal, no murmur, click, rub or gallop   Lungs: clear to auscultation bilaterally, no increased work of breathing or signs of respiratory distress   Breasts: normal, no dimpling or skin changes noted   Abdomen: soft, non-tender, without masses or organomegaly   Vulva: normal , no lesions   Vagina: normal , no lesions or dryness   Urethra: normal   Urethal meatus normal   Bladder Normal, soft, non-tender and no prolapse or masses appreciated   Cervix: normal, no palpable masses    Uterus: normal , non-tender, not enlarged, no palpable masses   Adnexa: normal, non-tender without fullness or masses; hemoccult neg     Lymphatic Palpation of lymph nodes in neck, axilla, groin and/or other locations: no lymphadenopathy or masses noted   Skin Normal skin turgor and no rashes    Palpation of skin and subcutaneous tissue normal

## 2019-10-31 ENCOUNTER — TELEPHONE (OUTPATIENT)
Dept: OBGYN CLINIC | Facility: MEDICAL CENTER | Age: 59
End: 2019-10-31

## 2019-10-31 NOTE — TELEPHONE ENCOUNTER
Hi patient called asking if it would be possible for her to get the 90 day supply of fluoxetine instead of the 30 day supply  She taking it every night, and only has two refills left  She is worried about what will happen when her prescription is out

## 2019-10-31 NOTE — TELEPHONE ENCOUNTER
I returned patient's call; left message to notofy us when she is at the month of her last refill; will send new /Rx for a 90 day supply of fluoxetine 10 mg

## 2019-12-03 DIAGNOSIS — F41.9 ANXIETY: ICD-10-CM

## 2019-12-03 RX ORDER — FLUOXETINE 10 MG/1
10 CAPSULE ORAL DAILY
Qty: 90 CAPSULE | Refills: 3 | Status: SHIPPED | OUTPATIENT
Start: 2019-12-03 | End: 2020-01-19

## 2020-01-09 LAB — HBA1C MFR BLD HPLC: 5.8 %

## 2020-01-15 ENCOUNTER — OFFICE VISIT (OUTPATIENT)
Dept: FAMILY MEDICINE CLINIC | Facility: CLINIC | Age: 60
End: 2020-01-15
Payer: COMMERCIAL

## 2020-01-15 VITALS
TEMPERATURE: 98.3 F | DIASTOLIC BLOOD PRESSURE: 82 MMHG | BODY MASS INDEX: 32.53 KG/M2 | OXYGEN SATURATION: 96 % | HEART RATE: 83 BPM | WEIGHT: 183.6 LBS | HEIGHT: 63 IN | SYSTOLIC BLOOD PRESSURE: 122 MMHG

## 2020-01-15 DIAGNOSIS — R79.82 ELEVATED C-REACTIVE PROTEIN (CRP): ICD-10-CM

## 2020-01-15 DIAGNOSIS — R73.9 HYPERGLYCEMIA: ICD-10-CM

## 2020-01-15 DIAGNOSIS — E03.9 ACQUIRED HYPOTHYROIDISM: Primary | ICD-10-CM

## 2020-01-15 DIAGNOSIS — E78.2 MIXED HYPERLIPIDEMIA: ICD-10-CM

## 2020-01-15 DIAGNOSIS — R76.8 POSITIVE ANA (ANTINUCLEAR ANTIBODY): ICD-10-CM

## 2020-01-15 PROCEDURE — 99214 OFFICE O/P EST MOD 30 MIN: CPT | Performed by: FAMILY MEDICINE

## 2020-01-15 PROCEDURE — 3008F BODY MASS INDEX DOCD: CPT | Performed by: FAMILY MEDICINE

## 2020-01-19 NOTE — PROGRESS NOTES
BMI Counseling: Body mass index is 32 52 kg/m²  The BMI is above normal  Nutrition recommendations include decreasing portion sizes, encouraging healthy choices of fruits and vegetables, decreasing fast food intake, consuming healthier snacks, limiting drinks that contain sugar, moderation in carbohydrate intake, increasing intake of lean protein, reducing intake of saturated and trans fat and reducing intake of cholesterol  Exercise recommendations include exercising 3-5 times per week  Patient referred to nutritionist due to patient being overweight

## 2020-01-19 NOTE — PROGRESS NOTES
Assessment/Plan:    Overall patient is stable  Blood pressure stable  Going to be see Dermatology with regards to hair related issues  A1c at 5 8  Mood stable  Recommend yearly mammography as well as routine gyn examinations  Due in 6 months for full labs     Diagnoses and all orders for this visit:    Acquired hypothyroidism  -     TSH, 3rd generation; Future  -     T4, free; Future  -     TSH, 3rd generation; Future    Hyperglycemia  -     Hemoglobin A1C; Future    Mixed hyperlipidemia  -     Basic metabolic panel; Future  -     Lipid panel; Future    Positive MEHDI (antinuclear antibody)  -     Ambulatory referral to Rheumatology; Future    Elevated C-reactive protein (CRP)  -     Ambulatory referral to Rheumatology; Future        1  Acquired hypothyroidism  TSH, 3rd generation    T4, free    TSH, 3rd generation   2  Hyperglycemia  Hemoglobin A1C   3  Mixed hyperlipidemia  Basic metabolic panel    Lipid panel   4  Positive MEHDI (antinuclear antibody)  Ambulatory referral to Rheumatology   5  Elevated C-reactive protein (CRP)  Ambulatory referral to Rheumatology       Subjective:        Patient ID: Jhoan Andre is a 63 y o  female  Chief Complaint   Patient presents with    Follow-up     6 months;       Routine recheck      The following portions of the patient's history were reviewed and updated as appropriate: past medical history, past surgical history and problem list       Review of Systems   Constitutional: Negative for fatigue  Eyes: Negative for visual disturbance  Respiratory: Negative for cough and shortness of breath  Cardiovascular: Negative for chest pain, palpitations and leg swelling  Gastrointestinal: Negative for abdominal pain  Neurological: Negative for dizziness and headaches  Psychiatric/Behavioral: The patient is not nervous/anxious            Objective:  /82 (BP Location: Left arm, Patient Position: Sitting, Cuff Size: Large)   Pulse 83   Temp 98 3 °F (36 8 °C) Ht 5' 3" (1 6 m)   Wt 83 3 kg (183 lb 9 6 oz)   LMP 02/28/2018   SpO2 96%   BMI 32 52 kg/m²        Physical Exam   Constitutional: She is oriented to person, place, and time  She appears well-nourished  No distress  HENT:   Head: Normocephalic  Eyes: Pupils are equal, round, and reactive to light  No scleral icterus  Cardiovascular: Normal heart sounds  No murmur heard  Pulmonary/Chest: Breath sounds normal    Musculoskeletal: She exhibits no edema  Lymphadenopathy:     She has no cervical adenopathy  Neurological: She is alert and oriented to person, place, and time  Psychiatric: She has a normal mood and affect  Her behavior is normal  Thought content normal    Nursing note and vitals reviewed

## 2020-02-02 DIAGNOSIS — E03.9 ACQUIRED HYPOTHYROIDISM: ICD-10-CM

## 2020-02-02 RX ORDER — LEVOTHYROXINE SODIUM 175 UG/1
TABLET ORAL
Qty: 90 TABLET | Refills: 0 | Status: SHIPPED | OUTPATIENT
Start: 2020-02-02 | End: 2020-04-23 | Stop reason: SDUPTHER

## 2020-02-27 ENCOUNTER — HOSPITAL ENCOUNTER (OUTPATIENT)
Dept: MAMMOGRAPHY | Facility: MEDICAL CENTER | Age: 60
Discharge: HOME | End: 2020-02-27
Payer: COMMERCIAL

## 2020-02-27 VITALS — BODY MASS INDEX: 32.43 KG/M2 | WEIGHT: 183 LBS | HEIGHT: 63 IN

## 2020-02-27 DIAGNOSIS — Z12.31 ENCOUNTER FOR SCREENING MAMMOGRAM FOR MALIGNANT NEOPLASM OF BREAST: ICD-10-CM

## 2020-02-27 PROCEDURE — 77067 SCR MAMMO BI INCL CAD: CPT

## 2020-02-27 PROCEDURE — 77063 BREAST TOMOSYNTHESIS BI: CPT

## 2020-04-02 ENCOUNTER — TELEMEDICINE (OUTPATIENT)
Dept: FAMILY MEDICINE CLINIC | Facility: CLINIC | Age: 60
End: 2020-04-02
Payer: COMMERCIAL

## 2020-04-02 ENCOUNTER — OFFICE VISIT (OUTPATIENT)
Dept: URGENT CARE | Facility: MEDICAL CENTER | Age: 60
End: 2020-04-02
Payer: COMMERCIAL

## 2020-04-02 DIAGNOSIS — U07.1 COVID-19 VIRUS INFECTION: Primary | ICD-10-CM

## 2020-04-02 DIAGNOSIS — Z20.828 EXPOSURE TO SARS-ASSOCIATED CORONAVIRUS: ICD-10-CM

## 2020-04-02 PROCEDURE — 99213 OFFICE O/P EST LOW 20 MIN: CPT | Performed by: FAMILY MEDICINE

## 2020-04-02 PROCEDURE — 87635 SARS-COV-2 COVID-19 AMP PRB: CPT | Performed by: FAMILY MEDICINE

## 2020-04-03 ENCOUNTER — TELEPHONE (OUTPATIENT)
Dept: OBGYN CLINIC | Facility: CLINIC | Age: 60
End: 2020-04-03

## 2020-04-03 ENCOUNTER — TELEPHONE (OUTPATIENT)
Dept: OBGYN CLINIC | Facility: HOSPITAL | Age: 60
End: 2020-04-03

## 2020-04-04 ENCOUNTER — TELEPHONE (OUTPATIENT)
Dept: OTHER | Facility: OTHER | Age: 60
End: 2020-04-04

## 2020-04-04 LAB
INPATIENT: NORMAL
SARS-COV-2 RNA SPEC QL NAA+PROBE: DETECTED

## 2020-04-05 ENCOUNTER — TELEPHONE (OUTPATIENT)
Dept: FAMILY MEDICINE CLINIC | Facility: CLINIC | Age: 60
End: 2020-04-05

## 2020-04-08 ENCOUNTER — TELEMEDICINE (OUTPATIENT)
Dept: FAMILY MEDICINE CLINIC | Facility: CLINIC | Age: 60
End: 2020-04-08
Payer: COMMERCIAL

## 2020-04-08 ENCOUNTER — TELEPHONE (OUTPATIENT)
Dept: FAMILY MEDICINE CLINIC | Facility: CLINIC | Age: 60
End: 2020-04-08

## 2020-04-08 DIAGNOSIS — U07.1 COVID-19 VIRUS INFECTION: Primary | ICD-10-CM

## 2020-04-08 DIAGNOSIS — J06.9 UPPER RESPIRATORY TRACT INFECTION, UNSPECIFIED TYPE: ICD-10-CM

## 2020-04-08 PROCEDURE — 99213 OFFICE O/P EST LOW 20 MIN: CPT | Performed by: FAMILY MEDICINE

## 2020-04-08 PROCEDURE — 1036F TOBACCO NON-USER: CPT | Performed by: FAMILY MEDICINE

## 2020-04-08 PROCEDURE — 3074F SYST BP LT 130 MM HG: CPT | Performed by: FAMILY MEDICINE

## 2020-04-08 PROCEDURE — 3079F DIAST BP 80-89 MM HG: CPT | Performed by: FAMILY MEDICINE

## 2020-04-08 RX ORDER — AZITHROMYCIN 250 MG/1
TABLET, FILM COATED ORAL
Qty: 6 TABLET | Refills: 0 | Status: SHIPPED | OUTPATIENT
Start: 2020-04-08 | End: 2020-04-12

## 2020-04-13 ENCOUNTER — DOCUMENTATION (OUTPATIENT)
Dept: FAMILY MEDICINE CLINIC | Facility: CLINIC | Age: 60
End: 2020-04-13

## 2020-04-20 ENCOUNTER — APPOINTMENT (OUTPATIENT)
Dept: RADIOLOGY | Facility: MEDICAL CENTER | Age: 60
End: 2020-04-20
Payer: COMMERCIAL

## 2020-04-20 ENCOUNTER — TELEPHONE (OUTPATIENT)
Dept: FAMILY MEDICINE CLINIC | Facility: CLINIC | Age: 60
End: 2020-04-20

## 2020-04-20 ENCOUNTER — TELEMEDICINE (OUTPATIENT)
Dept: FAMILY MEDICINE CLINIC | Facility: CLINIC | Age: 60
End: 2020-04-20
Payer: COMMERCIAL

## 2020-04-20 ENCOUNTER — OFFICE VISIT (OUTPATIENT)
Dept: PEDIATRICS CLINIC | Facility: CLINIC | Age: 60
End: 2020-04-20
Payer: COMMERCIAL

## 2020-04-20 ENCOUNTER — TRANSCRIBE ORDERS (OUTPATIENT)
Dept: URGENT CARE | Facility: MEDICAL CENTER | Age: 60
End: 2020-04-20

## 2020-04-20 VITALS
SYSTOLIC BLOOD PRESSURE: 126 MMHG | DIASTOLIC BLOOD PRESSURE: 80 MMHG | RESPIRATION RATE: 12 BRPM | TEMPERATURE: 98.1 F | HEART RATE: 88 BPM | OXYGEN SATURATION: 93 %

## 2020-04-20 VITALS — HEART RATE: 96 BPM | OXYGEN SATURATION: 96 %

## 2020-04-20 DIAGNOSIS — U07.1 COVID-19 VIRUS INFECTION: Primary | ICD-10-CM

## 2020-04-20 DIAGNOSIS — R05.9 COUGH: ICD-10-CM

## 2020-04-20 DIAGNOSIS — U07.1 COVID-19 VIRUS INFECTION: ICD-10-CM

## 2020-04-20 DIAGNOSIS — R06.00 DYSPNEA ON EXERTION: ICD-10-CM

## 2020-04-20 DIAGNOSIS — U07.1 COVID-19: Primary | ICD-10-CM

## 2020-04-20 PROCEDURE — 99214 OFFICE O/P EST MOD 30 MIN: CPT | Performed by: NURSE PRACTITIONER

## 2020-04-20 PROCEDURE — 99442 PR PHYS/QHP TELEPHONE EVALUATION 11-20 MIN: CPT | Performed by: NURSE PRACTITIONER

## 2020-04-20 PROCEDURE — 71046 X-RAY EXAM CHEST 2 VIEWS: CPT

## 2020-04-20 RX ORDER — ALBUTEROL SULFATE 90 UG/1
2 AEROSOL, METERED RESPIRATORY (INHALATION) EVERY 4 HOURS PRN
Qty: 1 INHALER | Refills: 0 | Status: SHIPPED | OUTPATIENT
Start: 2020-04-20

## 2020-04-23 ENCOUNTER — TELEMEDICINE (OUTPATIENT)
Dept: FAMILY MEDICINE CLINIC | Facility: CLINIC | Age: 60
End: 2020-04-23
Payer: COMMERCIAL

## 2020-04-23 DIAGNOSIS — U07.1 COVID-19 VIRUS INFECTION: Primary | ICD-10-CM

## 2020-04-23 DIAGNOSIS — E03.9 ACQUIRED HYPOTHYROIDISM: ICD-10-CM

## 2020-04-23 PROCEDURE — G2012 BRIEF CHECK IN BY MD/QHP: HCPCS | Performed by: FAMILY MEDICINE

## 2020-04-23 RX ORDER — LEVOTHYROXINE SODIUM 175 UG/1
175 TABLET ORAL DAILY
Qty: 90 TABLET | Refills: 1 | Status: SHIPPED | OUTPATIENT
Start: 2020-04-23 | End: 2020-11-02 | Stop reason: SDUPTHER

## 2020-04-27 ENCOUNTER — TELEPHONE (OUTPATIENT)
Dept: FAMILY MEDICINE CLINIC | Facility: CLINIC | Age: 60
End: 2020-04-27

## 2020-05-15 ENCOUNTER — TELEPHONE (OUTPATIENT)
Dept: FAMILY MEDICINE CLINIC | Facility: CLINIC | Age: 60
End: 2020-05-15

## 2020-05-15 DIAGNOSIS — F41.9 ANXIETY: Primary | ICD-10-CM

## 2020-05-15 RX ORDER — ALPRAZOLAM 0.25 MG/1
0.25 TABLET ORAL 2 TIMES DAILY PRN
Qty: 30 TABLET | Refills: 0 | Status: SHIPPED | OUTPATIENT
Start: 2020-05-15 | End: 2020-10-14

## 2020-05-21 LAB — HBA1C MFR BLD HPLC: 5.7 %

## 2020-05-27 ENCOUNTER — TELEMEDICINE (OUTPATIENT)
Dept: FAMILY MEDICINE CLINIC | Facility: CLINIC | Age: 60
End: 2020-05-27
Payer: COMMERCIAL

## 2020-05-27 ENCOUNTER — TELEPHONE (OUTPATIENT)
Dept: FAMILY MEDICINE CLINIC | Facility: CLINIC | Age: 60
End: 2020-05-27

## 2020-05-27 DIAGNOSIS — E03.9 ACQUIRED HYPOTHYROIDISM: Primary | ICD-10-CM

## 2020-05-27 DIAGNOSIS — E78.00 PURE HYPERCHOLESTEROLEMIA: ICD-10-CM

## 2020-05-27 DIAGNOSIS — Z63.4 BEREAVEMENT: ICD-10-CM

## 2020-05-27 DIAGNOSIS — R73.9 HYPERGLYCEMIA: ICD-10-CM

## 2020-05-27 PROCEDURE — 99213 OFFICE O/P EST LOW 20 MIN: CPT | Performed by: FAMILY MEDICINE

## 2020-05-27 SDOH — SOCIAL STABILITY - SOCIAL INSECURITY: DISSAPEARANCE AND DEATH OF FAMILY MEMBER: Z63.4

## 2020-06-09 ENCOUNTER — TELEPHONE (OUTPATIENT)
Dept: FAMILY MEDICINE CLINIC | Facility: CLINIC | Age: 60
End: 2020-06-09

## 2020-06-15 ENCOUNTER — TELEPHONE (OUTPATIENT)
Dept: OTHER | Facility: HOSPITAL | Age: 60
End: 2020-06-15

## 2020-06-16 ENCOUNTER — TELEPHONE (OUTPATIENT)
Dept: FAMILY MEDICINE CLINIC | Facility: CLINIC | Age: 60
End: 2020-06-16

## 2020-06-17 ENCOUNTER — TELEPHONE (OUTPATIENT)
Dept: OTHER | Facility: HOSPITAL | Age: 60
End: 2020-06-17

## 2020-07-07 ENCOUNTER — OFFICE VISIT (OUTPATIENT)
Dept: FAMILY MEDICINE CLINIC | Facility: CLINIC | Age: 60
End: 2020-07-07
Payer: COMMERCIAL

## 2020-07-07 VITALS
TEMPERATURE: 98.7 F | SYSTOLIC BLOOD PRESSURE: 140 MMHG | DIASTOLIC BLOOD PRESSURE: 80 MMHG | HEART RATE: 84 BPM | BODY MASS INDEX: 31.65 KG/M2 | HEIGHT: 62 IN | WEIGHT: 172 LBS | OXYGEN SATURATION: 97 %

## 2020-07-07 DIAGNOSIS — E78.00 PURE HYPERCHOLESTEROLEMIA: ICD-10-CM

## 2020-07-07 DIAGNOSIS — Z20.828 EXPOSURE TO SARS-ASSOCIATED CORONAVIRUS: ICD-10-CM

## 2020-07-07 DIAGNOSIS — E03.9 ACQUIRED HYPOTHYROIDISM: ICD-10-CM

## 2020-07-07 DIAGNOSIS — F43.21 GRIEVING: ICD-10-CM

## 2020-07-07 DIAGNOSIS — Z00.00 HEALTH MAINTENANCE EXAMINATION: Primary | ICD-10-CM

## 2020-07-07 DIAGNOSIS — R73.03 PREDIABETES: ICD-10-CM

## 2020-07-07 DIAGNOSIS — Z11.1 SCREENING FOR TUBERCULOSIS: ICD-10-CM

## 2020-07-07 DIAGNOSIS — Z23 ENCOUNTER FOR ADMINISTRATION OF VACCINE: ICD-10-CM

## 2020-07-07 DIAGNOSIS — R03.0 ELEVATED BLOOD-PRESSURE READING, WITHOUT DIAGNOSIS OF HYPERTENSION: ICD-10-CM

## 2020-07-07 PROCEDURE — 99396 PREV VISIT EST AGE 40-64: CPT | Performed by: NURSE PRACTITIONER

## 2020-07-07 PROCEDURE — 90471 IMMUNIZATION ADMIN: CPT

## 2020-07-07 PROCEDURE — 90715 TDAP VACCINE 7 YRS/> IM: CPT

## 2020-07-07 PROCEDURE — 86580 TB INTRADERMAL TEST: CPT

## 2020-07-07 PROCEDURE — 3008F BODY MASS INDEX DOCD: CPT | Performed by: NURSE PRACTITIONER

## 2020-07-07 RX ORDER — TACROLIMUS 1 MG/G
OINTMENT TOPICAL
COMMUNITY
Start: 2020-07-01

## 2020-07-07 NOTE — PATIENT INSTRUCTIONS
Follow up with Dr Reinaldo Sotelo in October- complete labs at that time  Return in 2 days for PPD read  Please call the office if you are experiencing any worsening of symptoms or no symptom improvement  Wellness Visit for Adults   AMBULATORY CARE:   A wellness visit  is when you see your healthcare provider to get screened for health problems  You can also get advice on how to stay healthy  Write down your questions so you remember to ask them  Ask your healthcare provider how often you should have a wellness visit  What happens at a wellness visit:  Your healthcare provider will ask about your health, and your family history of health problems  This includes high blood pressure, heart disease, and cancer  He or she will ask if you have symptoms that concern you, if you smoke, and about your mood  You may also be asked about your intake of medicines, supplements, food, and alcohol  Any of the following may be done:  · Your weight  will be checked  Your height may also be checked so your body mass index (BMI) can be calculated  Your BMI shows if you are at a healthy weight  · Your blood pressure  and heart rate will be checked  Your temperature may also be checked  · Blood and urine tests  may be done  Blood tests may be done to check your cholesterol levels  Abnormal cholesterol levels increase your risk for heart disease and stroke  You may also need a blood or urine test to check for diabetes if you are at increased risk  Urine tests may be done to look for signs of an infection or kidney disease  · A physical exam  includes checking your heartbeat and lungs with a stethoscope  Your healthcare provider may also check your skin to look for sun damage  · Screening tests  may be recommended  A screening test is done to check for diseases that may not cause symptoms  The screening tests you may need depend on your age, gender, family history, and lifestyle habits   For example, colorectal screening may be recommended if you are 48years old or older  Screening tests you need if you are a woman:   · A Pap smear  is used to screen for cervical cancer  Pap smears are usually done every 3 to 5 years depending on your age  You may need them more often if you have had abnormal Pap smear test results in the past  Ask your healthcare provider how often you should have a Pap smear  · A mammogram  is an x-ray of your breasts to screen for breast cancer  Experts recommend mammograms every 2 years starting at age 48 years  You may need a mammogram at age 52 years or younger if you have an increased risk for breast cancer  Talk to your healthcare provider about when you should start having mammograms and how often you need them  Vaccines you may need:   · Get an influenza vaccine  every year  The influenza vaccine protects you from the flu  Several types of viruses cause the flu  The viruses change over time, so new vaccines are made each year  · Get a tetanus-diphtheria (Td) booster vaccine  every 10 years  This vaccine protects you against tetanus and diphtheria  Tetanus is a severe infection that may cause painful muscle spasms and lockjaw  Diphtheria is a severe bacterial infection that causes a thick covering in the back of your mouth and throat  · Get a human papillomavirus (HPV) vaccine  if you are female and aged 23 to 32 or male 23 to 24 and never received it  This vaccine protects you from HPV infection  HPV is the most common infection spread by sexual contact  HPV may also cause vaginal, penile, and anal cancers  · Get a pneumococcal vaccine  if you are aged 72 years or older  The pneumococcal vaccine is an injection given to protect you from pneumococcal disease  Pneumococcal disease is an infection caused by pneumococcal bacteria  The infection may cause pneumonia, meningitis, or an ear infection      · Get a shingles vaccine  if you are aged 61 or older, even if you have had shingles before  The shingles vaccine is an injection to protect you from the varicella-zoster virus  This is the same virus that causes chickenpox  Shingles is a painful rash that develops in people who had chickenpox or have been exposed to the virus  How to eat healthy:  My Plate is a model for planning healthy meals  It shows the types and amounts of foods that should go on your plate  Fruits and vegetables make up about half of your plate, and grains and protein make up the other half  A serving of dairy is included on the side of your plate  The amount of calories and serving sizes you need depends on your age, gender, weight, and height  Examples of healthy foods are listed below:  · Eat a variety of vegetables  such as dark green, red, and orange vegetables  You can also include canned vegetables low in sodium (salt) and frozen vegetables without added butter or sauces  · Eat a variety of fresh fruits , canned fruit in 100% juice, frozen fruit, and dried fruit  · Include whole grains  At least half of the grains you eat should be whole grains  Examples include whole-wheat bread, wheat pasta, brown rice, and whole-grain cereals such as oatmeal     · Eat a variety of protein foods such as seafood (fish and shellfish), lean meat, and poultry without skin (turkey and chicken)  Examples of lean meats include pork leg, shoulder, or tenderloin, and beef round, sirloin, tenderloin, and extra lean ground beef  Other protein foods include eggs and egg substitutes, beans, peas, soy products, nuts, and seeds  · Choose low-fat dairy products such as skim or 1% milk or low-fat yogurt, cheese, and cottage cheese  · Limit unhealthy fats  such as butter, hard margarine, and shortening  Exercise:  Exercise at least 30 minutes per day on most days of the week  Some examples of exercise include walking, biking, dancing, and swimming   You can also fit in more physical activity by taking the stairs instead of the elevator or parking farther away from stores  Include muscle strengthening activities 2 days each week  Regular exercise provides many health benefits  It helps you manage your weight, and decreases your risk for type 2 diabetes, heart disease, stroke, and high blood pressure  Exercise can also help improve your mood  Ask your healthcare provider about the best exercise plan for you  General health and safety guidelines:   · Do not smoke  Nicotine and other chemicals in cigarettes and cigars can cause lung damage  Ask your healthcare provider for information if you currently smoke and need help to quit  E-cigarettes or smokeless tobacco still contain nicotine  Talk to your healthcare provider before you use these products  · Limit alcohol  A drink of alcohol is 12 ounces of beer, 5 ounces of wine, or 1½ ounces of liquor  · Lose weight, if needed  Being overweight increases your risk of certain health conditions  These include heart disease, high blood pressure, type 2 diabetes, and certain types of cancer  · Protect your skin  Do not sunbathe or use tanning beds  Use sunscreen with a SPF 15 or higher  Apply sunscreen at least 15 minutes before you go outside  Reapply sunscreen every 2 hours  Wear protective clothing, hats, and sunglasses when you are outside  · Drive safely  Always wear your seatbelt  Make sure everyone in your car wears a seatbelt  A seatbelt can save your life if you are in an accident  Do not use your cell phone when you are driving  This could distract you and cause an accident  Pull over if you need to make a call or send a text message  · Practice safe sex  Use latex condoms if are sexually active and have more than one partner  Your healthcare provider may recommend screening tests for sexually transmitted infections (STIs)  · Wear helmets, lifejackets, and protective gear    Always wear a helmet when you ride a bike or motorcycle, go skiing, or play sports that could cause a head injury  Wear protective equipment when you play sports  Wear a lifejacket when you are on a boat or doing water sports  © 2017 [employer] Information is for End User's use only and may not be sold, redistributed or otherwise used for commercial purposes  All illustrations and images included in CareNotes® are the copyrighted property of [employer]  or [employer]  The above information is an  only  It is not intended as medical advice for individual conditions or treatments  Talk to your doctor, nurse or pharmacist before following any medical regimen to see if it is safe and effective for you

## 2020-07-07 NOTE — PROGRESS NOTES
Assessment/Plan:    Health Maintenance  Lifestyle Advice: follow a low fat, low cholesterol diet, attempt to lose weight, reduce exposure to stress, continue current medications and return for routine annual checkups  Diet: well balanced diet  Exercise: daily   Dental: regular dental visits and brushes teeth twice daily  Vision: goes for regu lar eye exams  Preventative Health: Female Preventative: Exercises regularly  Last mammogram was 2/27/20  Last PAP was 3/9/2018    Hypothyroidism  Last TSH not at goal  Recently had medication change, 1/2 tab sundays and 1 tab all other days  Patient has labs to be done for recheck  Patient asymptomatic  Prediabetes  Lab Results   Component Value Date    HGBA1C 5 7 (H) 05/21/2020     Has labs ordered for recheck in about 6 months- patient aware of dietary changes needed  Patient exercising daily  Pure hypercholesterolemia          Ref Range & Units 5/21/20  7:12 AM   Cholesterol <200 mg/dL 180    Triglyceride <150 mg/dL 137    Cholesterol, HDL, Direct >40 mg/dL 56    Cholesterol, Non-HDL <160 mg/dL 124    Comment: Note: For NCEP interpretive guidelines please refer to the Laboratory Handbook  Cholesterol, LDL, Calculated <130 mg/dL 97    Comment: LDL Cholesterol was calculated using the Friedewald equation  Direct measurement of LDL is not indicated for this patient based on [facility]'s analytical algorithm for measurement of LDL Cholesterol  CHOL/HDL Ratio   3 21      Patient eating well balanced diet and exercising daily  Grieving  Patient appears to have healthy/ functional grieving  She has a lot of support from friends and family and is looking forward to new job and embracing some changes  She openly speaks about her   She is looking into a grief support group she has information on  A friend gave her some literature as well  Discussed available resources with patient  She has used xanax rarely  she states she does not like taking medications   Advised patient that if she felt she needed it more, we can absolutely discuss other daily maintenance medications if needed  Please call the office if you are experiencing any worsening of symptoms or no symptom improvement  Elevated blood-pressure reading, without diagnosis of hypertension  Advised to monitor BP at home, she states it's usually well under 140/90- she will monitor at home and call with readings  Patient asymptomatic  Follow up and labs in October with Dr Sarah Haney  Ordered covid screening as initial testing for pre employment came back indeterminat  Patient asymptomatic  Diagnoses and all orders for this visit:    Health maintenance examination    Acquired hypothyroidism    Prediabetes    Exposure to SARS-associated coronavirus  -     MISC COVID-19 TEST- Collected at [facility] or [facility]; Future    Encounter for administration of vaccine  -     TDAP VACCINE GREATER THAN OR EQUAL TO 6YO IM    Screening for tuberculosis  -     TB Skin Test    Pure hypercholesterolemia    Grieving    Elevated blood-pressure reading, without diagnosis of hypertension    Other orders  -     tacrolimus (PROTOPIC) 0 1 % ointment; APPLY TO THE AFFECTED AREAS ON LEGS MONDAY THROUGH FRIDAY TWICE A DAY              Subjective:    Patient ID: Shi Brandon is a 63 y o  female  Here for physical exam with paperwork to be completed for new job at [employer]  She is overall doing okay  She is grieving the loss of her  but is doing this in a healthy manner  She has a lot of support from friends and family  She has met a hospice chaplain  She is aware of spousal grief support groups  She is looking forward to starting her new job  She has no health complaints at this time       The following portions of the patient's history were reviewed and updated as appropriate: allergies, current medications, past family history, past medical history, past social history, past surgical history and problem list   Review of Systems   Constitutional: Negative for chills and fever  Eyes: Negative for discharge  Respiratory: Negative for shortness of breath  Cardiovascular: Negative for chest pain  Gastrointestinal: Negative for constipation and diarrhea  Genitourinary: Negative for difficulty urinating  Musculoskeletal: Negative for joint swelling  Skin: Negative for rash  Neurological: Negative for headaches  Hematological: Negative for adenopathy  Psychiatric/Behavioral: The patient is not nervous/anxious  Objective:  /80 (BP Location: Left arm, Patient Position: Sitting, Cuff Size: Standard)   Pulse 84   Temp 98 7 °F (37 1 °C) (Temporal)   Ht 5' 2 25" (1 581 m)   Wt 78 kg (172 lb)   LMP 02/28/2018   SpO2 97%   BMI 31 21 kg/m²        Physical Exam   Constitutional: She is oriented to person, place, and time  She appears well-developed  No distress  obese   HENT:   Head: Normocephalic and atraumatic  Right Ear: External ear and ear canal normal  Tympanic membrane is not erythematous, not retracted and not bulging  Left Ear: External ear and ear canal normal  Tympanic membrane is not erythematous, not retracted and not bulging  Eyes: Pupils are equal, round, and reactive to light  Conjunctivae, EOM and lids are normal  Right eye exhibits no discharge  Left eye exhibits no discharge  No scleral icterus  Neck: Normal range of motion  Neck supple  Cardiovascular: Normal rate and regular rhythm  No murmur heard  Pulmonary/Chest: Effort normal and breath sounds normal  No respiratory distress  She has no wheezes  Abdominal: Soft  Bowel sounds are normal  There is no tenderness  Musculoskeletal: She exhibits no deformity  Neurological: She is alert and oriented to person, place, and time  No cranial nerve deficit  Coordination normal    Skin: Skin is warm and dry  She is not diaphoretic  Psychiatric: She has a normal mood and affect   Her speech is normal and behavior is normal  Judgment and thought content normal  Cognition and memory are normal    Nursing note and vitals reviewed  Codeine; Eggs or egg-derived products; Kiwi extract; and Latex    Lowcelina Mays had no medications administered during this visit    Health Maintenance   Topic Date Due    Hepatitis C Screening  1960    HIV Screening  11/07/1976    Influenza Vaccine  07/01/2020    Annual Physical  07/11/2020    Depression Screening PHQ  01/15/2021    BMI: Followup Plan  01/19/2021    MAMMOGRAM  02/27/2021    Cervical Cancer Screening  03/09/2021    BMI: Adult  07/07/2021    CRC Screening: Colonoscopy  05/12/2022    Pneumococcal Vaccine: 65+ Years (1 of 2 - PCV13) 11/07/2026    DTaP,Tdap,and Td Vaccines (3 - Td) 07/07/2030    Pneumococcal Vaccine: Pediatrics (0 to 5 Years) and At-Risk Patients (6 to 59 Years)  Aged Out    HIB Vaccine  Aged Out    Hepatitis B Vaccine  Aged Out    IPV Vaccine  Aged Out    Hepatitis A Vaccine  Aged Out    Meningococcal ACWY Vaccine  Aged Out    HPV Vaccine  Aged Out      Social History     Socioeconomic History    Marital status: /Civil Union     Spouse name: Not on file    Number of children: Not on file    Years of education: Not on file    Highest education level: Not on file   Occupational History    Not on file   Social Needs    Financial resource strain: Not on file    Food insecurity:     Worry: Not on file     Inability: Not on file   Secoo needs:     Medical: Not on file     Non-medical: Not on file   Tobacco Use    Smoking status: Former Smoker    Smokeless tobacco: Never Used   Substance and Sexual Activity    Alcohol use: Not Currently     Comment: rare    Drug use: No    Sexual activity: Yes     Partners: Male     Birth control/protection: Female Sterilization   Lifestyle    Physical activity:     Days per week: Not on file     Minutes per session: Not on file    Stress: Not on file   Relationships    Social connections:     Talks on phone: Not on file     Gets together: Not on file     Attends Congregational service: Not on file     Active member of club or organization: Not on file     Attends meetings of clubs or organizations: Not on file     Relationship status: Not on file    Intimate partner violence:     Fear of current or ex partner: Not on file     Emotionally abused: Not on file     Physically abused: Not on file     Forced sexual activity: Not on file   Other Topics Concern    Not on file   Social History Narrative    Not on file      Family History   Problem Relation Age of Onset    Hypertension Mother         benign essential    Cancer Mother 66        pacreas related    Hypertension Father         benign essential    Migraines Sister     No Known Problems Maternal Grandmother     No Known Problems Maternal Grandfather     No Known Problems Paternal Grandmother     No Known Problems Paternal Grandfather     No Known Problems Maternal Aunt     No Known Problems Paternal Aunt     No Known Problems Paternal Aunt     No Known Problems Paternal Aunt       Past Medical History:   Diagnosis Date    Abnormal glucose     last assessed: 09/08/16    Abnormal weight gain     last assessed: 07/10/13    Candidal vulvovaginitis     last assessed: 03/11/14    Cervical polyp     last assessed: 03/11/14    Disease of thyroid gland     hypothyroidism     Hot flashes due to menopause     Spontaneous vaginal delivery     history of two vaginal deliveries    Vitamin D deficiency     last assessed: 09/08/16      has a past surgical history that includes Foot surgery (Right, 2002); pr colonoscopy flx dx w/collj spec when pfrmd (N/A, 5/12/2017); Dental surgery; Rotator cuff repair (2017); and Tubal ligation     Patient Active Problem List    Diagnosis Date Noted   Romy Carr 07/08/2020    Elevated blood-pressure reading, without diagnosis of hypertension 07/08/2020    Prediabetes 04/19/2017    Menopausal hot flushes 10/26/2016    Pure hypercholesterolemia 07/17/2014    Pain, joint, shoulder 07/17/2014    Cervicitis and endocervicitis 03/11/2014    Hypothyroidism 06/28/2012       Current Outpatient Medications:     Cholecalciferol (VITAMIN D PO), Take by mouth, Disp: , Rfl:     levothyroxine 175 mcg tablet, Take 1 tablet (175 mcg total) by mouth daily, Disp: 90 tablet, Rfl: 1    tacrolimus (PROTOPIC) 0 1 % ointment, APPLY TO THE AFFECTED AREAS ON LEGS MONDAY THROUGH FRIDAY TWICE A DAY, Disp: , Rfl:     albuterol (PROVENTIL HFA,VENTOLIN HFA) 90 mcg/act inhaler, Inhale 2 puffs every 4 (four) hours as needed for wheezing or shortness of breath (cough) (Patient not taking: Reported on 7/7/2020), Disp: 1 Inhaler, Rfl: 0    ALPRAZolam (XANAX) 0 25 mg tablet, Take 1 tablet (0 25 mg total) by mouth 2 (two) times a day as needed for anxiety (Patient not taking: Reported on 7/7/2020), Disp: 30 tablet, Rfl: 0

## 2020-07-08 DIAGNOSIS — Z20.828 EXPOSURE TO SARS-ASSOCIATED CORONAVIRUS: ICD-10-CM

## 2020-07-08 PROBLEM — F43.21 GRIEVING: Status: ACTIVE | Noted: 2020-07-08

## 2020-07-08 PROBLEM — U07.1 COVID-19 VIRUS INFECTION: Status: RESOLVED | Noted: 2020-04-23 | Resolved: 2020-07-08

## 2020-07-08 PROBLEM — R03.0 ELEVATED BLOOD-PRESSURE READING, WITHOUT DIAGNOSIS OF HYPERTENSION: Status: ACTIVE | Noted: 2020-07-08

## 2020-07-08 PROCEDURE — U0003 INFECTIOUS AGENT DETECTION BY NUCLEIC ACID (DNA OR RNA); SEVERE ACUTE RESPIRATORY SYNDROME CORONAVIRUS 2 (SARS-COV-2) (CORONAVIRUS DISEASE [COVID-19]), AMPLIFIED PROBE TECHNIQUE, MAKING USE OF HIGH THROUGHPUT TECHNOLOGIES AS DESCRIBED BY CMS-2020-01-R: HCPCS

## 2020-07-08 NOTE — ASSESSMENT & PLAN NOTE
Advised to monitor BP at home, she states it's usually well under 140/90- she will monitor at home and call with readings  Patient asymptomatic

## 2020-07-08 NOTE — ASSESSMENT & PLAN NOTE
Patient appears to have healthy/ functional grieving  She has a lot of support from friends and family and is looking forward to new job and embracing some changes  She openly speaks about her   She is looking into a grief support group she has information on  A friend gave her some literature as well  Discussed available resources with patient  She has used xanax rarely  she states she does not like taking medications  Advised patient that if she felt she needed it more, we can absolutely discuss other daily maintenance medications if needed  Please call the office if you are experiencing any worsening of symptoms or no symptom improvement

## 2020-07-08 NOTE — ASSESSMENT & PLAN NOTE
Ref Range & Units 5/21/20  7:12 AM   Cholesterol <200 mg/dL 180    Triglyceride <150 mg/dL 137    Cholesterol, HDL, Direct >40 mg/dL 56    Cholesterol, Non-HDL <160 mg/dL 124    Comment: Note: For NCEP interpretive guidelines please refer to the Laboratory Handbook  Cholesterol, LDL, Calculated <130 mg/dL 97    Comment: LDL Cholesterol was calculated using the Friedewald equation  Direct measurement of LDL is not indicated for this patient based on [facility]'s analytical algorithm for measurement of LDL Cholesterol  CHOL/HDL Ratio   3 21      Patient eating well balanced diet and exercising daily

## 2020-07-08 NOTE — ASSESSMENT & PLAN NOTE
Last TSH not at goal  Recently had medication change, 1/2 tab sundays and 1 tab all other days  Patient has labs to be done for recheck  Patient asymptomatic

## 2020-07-08 NOTE — ASSESSMENT & PLAN NOTE
Lab Results   Component Value Date    HGBA1C 5 7 (H) 05/21/2020     Has labs ordered for recheck in about 6 months- patient aware of dietary changes needed  Patient exercising daily

## 2020-07-10 ENCOUNTER — CLINICAL SUPPORT (OUTPATIENT)
Dept: FAMILY MEDICINE CLINIC | Facility: CLINIC | Age: 60
End: 2020-07-10

## 2020-07-10 DIAGNOSIS — Z11.1 SCREENING FOR TUBERCULOSIS: Primary | ICD-10-CM

## 2020-07-10 LAB
INDURATION: 0 MM
TB SKIN TEST: NEGATIVE

## 2020-07-13 ENCOUNTER — TELEPHONE (OUTPATIENT)
Dept: FAMILY MEDICINE CLINIC | Facility: CLINIC | Age: 60
End: 2020-07-13

## 2020-07-13 LAB — SARS-COV-2 RNA SPEC QL NAA+PROBE: NOT DETECTED

## 2020-07-14 ENCOUNTER — TELEPHONE (OUTPATIENT)
Dept: FAMILY MEDICINE CLINIC | Facility: CLINIC | Age: 60
End: 2020-07-14

## 2020-07-14 NOTE — TELEPHONE ENCOUNTER
----- Message from [address] sent at 7/14/2020  7:43 AM EDT -----  Her repeat covid test is negative

## 2020-07-17 ENCOUNTER — CLINICAL SUPPORT (OUTPATIENT)
Dept: FAMILY MEDICINE CLINIC | Facility: CLINIC | Age: 60
End: 2020-07-17

## 2020-07-17 DIAGNOSIS — Z11.1 SCREENING FOR TUBERCULOSIS: Primary | ICD-10-CM

## 2020-07-17 PROCEDURE — 86580 TB INTRADERMAL TEST: CPT | Performed by: FAMILY MEDICINE

## 2020-07-20 ENCOUNTER — CLINICAL SUPPORT (OUTPATIENT)
Dept: FAMILY MEDICINE CLINIC | Facility: CLINIC | Age: 60
End: 2020-07-20

## 2020-07-20 DIAGNOSIS — Z11.1 ENCOUNTER FOR PPD SKIN TEST READING: Primary | ICD-10-CM

## 2020-07-20 LAB
INDURATION: NORMAL MM
TB SKIN TEST: NEGATIVE

## 2020-10-14 ENCOUNTER — TELEMEDICINE (OUTPATIENT)
Dept: FAMILY MEDICINE CLINIC | Facility: CLINIC | Age: 60
End: 2020-10-14
Payer: COMMERCIAL

## 2020-10-14 DIAGNOSIS — R05.9 COUGH: ICD-10-CM

## 2020-10-14 DIAGNOSIS — R05.9 COUGH: Primary | ICD-10-CM

## 2020-10-14 DIAGNOSIS — J98.8 RESPIRATORY INFECTION: ICD-10-CM

## 2020-10-14 PROCEDURE — U0003 INFECTIOUS AGENT DETECTION BY NUCLEIC ACID (DNA OR RNA); SEVERE ACUTE RESPIRATORY SYNDROME CORONAVIRUS 2 (SARS-COV-2) (CORONAVIRUS DISEASE [COVID-19]), AMPLIFIED PROBE TECHNIQUE, MAKING USE OF HIGH THROUGHPUT TECHNOLOGIES AS DESCRIBED BY CMS-2020-01-R: HCPCS | Performed by: FAMILY MEDICINE

## 2020-10-14 PROCEDURE — 99213 OFFICE O/P EST LOW 20 MIN: CPT | Performed by: FAMILY MEDICINE

## 2020-10-14 PROCEDURE — 1036F TOBACCO NON-USER: CPT | Performed by: FAMILY MEDICINE

## 2020-10-14 RX ORDER — PREDNISONE 10 MG/1
TABLET ORAL
Qty: 21 TABLET | Refills: 0 | Status: SHIPPED | OUTPATIENT
Start: 2020-10-14 | End: 2020-11-02

## 2020-10-15 LAB — SARS-COV-2 RNA SPEC QL NAA+PROBE: NOT DETECTED

## 2020-10-16 ENCOUNTER — TELEPHONE (OUTPATIENT)
Dept: FAMILY MEDICINE CLINIC | Facility: CLINIC | Age: 60
End: 2020-10-16

## 2020-10-19 ENCOUNTER — TELEPHONE (OUTPATIENT)
Dept: FAMILY MEDICINE CLINIC | Facility: CLINIC | Age: 60
End: 2020-10-19

## 2020-10-19 DIAGNOSIS — J06.9 UPPER RESPIRATORY TRACT INFECTION, UNSPECIFIED TYPE: Primary | ICD-10-CM

## 2020-10-19 RX ORDER — AZITHROMYCIN 250 MG/1
TABLET, FILM COATED ORAL
Qty: 6 TABLET | Refills: 0 | Status: SHIPPED | OUTPATIENT
Start: 2020-10-19 | End: 2020-10-24

## 2020-10-26 LAB — HBA1C MFR BLD HPLC: 5.9 %

## 2020-10-28 ENCOUNTER — TELEPHONE (OUTPATIENT)
Dept: FAMILY MEDICINE CLINIC | Facility: CLINIC | Age: 60
End: 2020-10-28

## 2020-10-28 DIAGNOSIS — R05.9 COUGH: Primary | ICD-10-CM

## 2020-10-28 RX ORDER — BENZONATATE 100 MG/1
100 CAPSULE ORAL 3 TIMES DAILY PRN
Qty: 30 CAPSULE | Refills: 0 | Status: SHIPPED | OUTPATIENT
Start: 2020-10-28

## 2020-11-02 ENCOUNTER — OFFICE VISIT (OUTPATIENT)
Dept: FAMILY MEDICINE CLINIC | Facility: CLINIC | Age: 60
End: 2020-11-02
Payer: COMMERCIAL

## 2020-11-02 VITALS
DIASTOLIC BLOOD PRESSURE: 80 MMHG | TEMPERATURE: 97.6 F | HEART RATE: 87 BPM | BODY MASS INDEX: 30.18 KG/M2 | WEIGHT: 164 LBS | SYSTOLIC BLOOD PRESSURE: 134 MMHG | HEIGHT: 62 IN | OXYGEN SATURATION: 98 %

## 2020-11-02 DIAGNOSIS — R05.9 COUGH: ICD-10-CM

## 2020-11-02 DIAGNOSIS — E78.00 PURE HYPERCHOLESTEROLEMIA: ICD-10-CM

## 2020-11-02 DIAGNOSIS — R73.03 PREDIABETES: ICD-10-CM

## 2020-11-02 DIAGNOSIS — E03.9 ACQUIRED HYPOTHYROIDISM: Primary | ICD-10-CM

## 2020-11-02 PROCEDURE — 90471 IMMUNIZATION ADMIN: CPT

## 2020-11-02 PROCEDURE — 1036F TOBACCO NON-USER: CPT | Performed by: FAMILY MEDICINE

## 2020-11-02 PROCEDURE — 3008F BODY MASS INDEX DOCD: CPT | Performed by: FAMILY MEDICINE

## 2020-11-02 PROCEDURE — 90682 RIV4 VACC RECOMBINANT DNA IM: CPT

## 2020-11-02 PROCEDURE — 99214 OFFICE O/P EST MOD 30 MIN: CPT | Performed by: FAMILY MEDICINE

## 2020-11-02 RX ORDER — LEVOTHYROXINE SODIUM 175 UG/1
175 TABLET ORAL DAILY
Qty: 90 TABLET | Refills: 1 | Status: SHIPPED | OUTPATIENT
Start: 2020-11-02 | End: 2021-02-15

## 2020-11-02 RX ORDER — TIOTROPIUM BROMIDE INHALATION SPRAY 3.12 UG/1
2 SPRAY, METERED RESPIRATORY (INHALATION) DAILY
Qty: 1 INHALER | Refills: 0 | Status: SHIPPED | COMMUNITY
Start: 2020-11-02 | End: 2020-11-17 | Stop reason: SDUPTHER

## 2020-11-03 ENCOUNTER — TELEPHONE (OUTPATIENT)
Dept: FAMILY MEDICINE CLINIC | Facility: CLINIC | Age: 60
End: 2020-11-03

## 2020-11-17 ENCOUNTER — TELEPHONE (OUTPATIENT)
Dept: FAMILY MEDICINE CLINIC | Facility: CLINIC | Age: 60
End: 2020-11-17

## 2020-11-17 DIAGNOSIS — J45.30 MILD PERSISTENT REACTIVE AIRWAY DISEASE WITHOUT COMPLICATION: Primary | ICD-10-CM

## 2020-11-17 DIAGNOSIS — R05.9 COUGH: ICD-10-CM

## 2020-11-17 RX ORDER — TIOTROPIUM BROMIDE INHALATION SPRAY 3.12 UG/1
2 SPRAY, METERED RESPIRATORY (INHALATION) DAILY
Qty: 1 INHALER | Refills: 0 | Status: SHIPPED | COMMUNITY
Start: 2020-11-17 | End: 2020-11-17

## 2021-02-13 DIAGNOSIS — E03.9 ACQUIRED HYPOTHYROIDISM: ICD-10-CM

## 2021-02-15 RX ORDER — LEVOTHYROXINE SODIUM 175 UG/1
TABLET ORAL
Qty: 90 TABLET | Refills: 1 | Status: SHIPPED | OUTPATIENT
Start: 2021-02-15 | End: 2021-09-28 | Stop reason: SDUPTHER

## 2021-09-21 ENCOUNTER — TELEPHONE (OUTPATIENT)
Dept: FAMILY MEDICINE CLINIC | Facility: CLINIC | Age: 61
End: 2021-09-21

## 2021-09-21 DIAGNOSIS — E03.9 ACQUIRED HYPOTHYROIDISM: Primary | ICD-10-CM

## 2021-09-21 NOTE — TELEPHONE ENCOUNTER
TSH order in computer  If she goes to [facility] make sure they only do the TSH as there are other labs pending    Let her know about the [facility] price check her because if she has pain cash she can get it done at the hospital and it has 70% discount

## 2021-09-23 ENCOUNTER — APPOINTMENT (OUTPATIENT)
Dept: LAB | Facility: HOSPITAL | Age: 61
End: 2021-09-23
Payer: COMMERCIAL

## 2021-09-23 DIAGNOSIS — E03.9 ACQUIRED HYPOTHYROIDISM: ICD-10-CM

## 2021-09-23 LAB — TSH SERPL DL<=0.05 MIU/L-ACNC: 0.56 UIU/ML (ref 0.36–3.74)

## 2021-09-23 PROCEDURE — 36415 COLL VENOUS BLD VENIPUNCTURE: CPT

## 2021-09-23 PROCEDURE — 84443 ASSAY THYROID STIM HORMONE: CPT

## 2021-09-28 DIAGNOSIS — E03.9 ACQUIRED HYPOTHYROIDISM: ICD-10-CM

## 2021-09-28 RX ORDER — LEVOTHYROXINE SODIUM 175 UG/1
175 TABLET ORAL DAILY
Qty: 90 TABLET | Refills: 1 | Status: SHIPPED | OUTPATIENT
Start: 2021-09-28 | End: 2022-04-15 | Stop reason: SDUPTHER

## 2021-11-18 ENCOUNTER — OFFICE VISIT (OUTPATIENT)
Dept: FAMILY MEDICINE CLINIC | Facility: CLINIC | Age: 61
End: 2021-11-18
Payer: COMMERCIAL

## 2021-11-18 VITALS — SYSTOLIC BLOOD PRESSURE: 132 MMHG | BODY MASS INDEX: 30.24 KG/M2 | HEIGHT: 62 IN | DIASTOLIC BLOOD PRESSURE: 82 MMHG

## 2021-11-18 DIAGNOSIS — M25.462 EFFUSION OF LEFT KNEE JOINT: ICD-10-CM

## 2021-11-18 DIAGNOSIS — M25.561 ACUTE PAIN OF RIGHT KNEE: Primary | ICD-10-CM

## 2021-11-18 PROCEDURE — 99213 OFFICE O/P EST LOW 20 MIN: CPT | Performed by: FAMILY MEDICINE

## 2021-11-18 RX ORDER — PREDNISONE 10 MG/1
TABLET ORAL
Qty: 30 TABLET | Refills: 0 | Status: SHIPPED | OUTPATIENT
Start: 2021-11-18

## 2021-11-18 RX ORDER — HYDROCODONE BITARTRATE AND ACETAMINOPHEN 5; 325 MG/1; MG/1
1 TABLET ORAL EVERY 6 HOURS PRN
Qty: 20 TABLET | Refills: 0 | Status: SHIPPED | OUTPATIENT
Start: 2021-11-18

## 2021-11-19 ENCOUNTER — OFFICE VISIT (OUTPATIENT)
Dept: OBGYN CLINIC | Facility: CLINIC | Age: 61
End: 2021-11-19
Payer: COMMERCIAL

## 2021-11-19 ENCOUNTER — APPOINTMENT (OUTPATIENT)
Dept: RADIOLOGY | Facility: CLINIC | Age: 61
End: 2021-11-19
Payer: COMMERCIAL

## 2021-11-19 VITALS
HEIGHT: 62 IN | SYSTOLIC BLOOD PRESSURE: 160 MMHG | DIASTOLIC BLOOD PRESSURE: 100 MMHG | WEIGHT: 176.8 LBS | BODY MASS INDEX: 32.54 KG/M2

## 2021-11-19 DIAGNOSIS — M25.562 ACUTE PAIN OF LEFT KNEE: ICD-10-CM

## 2021-11-19 DIAGNOSIS — M25.561 ACUTE PAIN OF RIGHT KNEE: ICD-10-CM

## 2021-11-19 DIAGNOSIS — M25.561 RIGHT KNEE PAIN, UNSPECIFIED CHRONICITY: ICD-10-CM

## 2021-11-19 DIAGNOSIS — M25.462 EFFUSION OF LEFT KNEE JOINT: ICD-10-CM

## 2021-11-19 DIAGNOSIS — M17.12 PRIMARY OSTEOARTHRITIS OF LEFT KNEE: Primary | ICD-10-CM

## 2021-11-19 PROCEDURE — 73564 X-RAY EXAM KNEE 4 OR MORE: CPT

## 2021-11-19 PROCEDURE — 73562 X-RAY EXAM OF KNEE 3: CPT

## 2021-11-19 PROCEDURE — 99243 OFF/OP CNSLTJ NEW/EST LOW 30: CPT | Performed by: ORTHOPAEDIC SURGERY

## 2021-11-19 PROCEDURE — 20610 DRAIN/INJ JOINT/BURSA W/O US: CPT | Performed by: ORTHOPAEDIC SURGERY

## 2021-11-19 PROCEDURE — 1036F TOBACCO NON-USER: CPT | Performed by: ORTHOPAEDIC SURGERY

## 2021-11-19 PROCEDURE — 3008F BODY MASS INDEX DOCD: CPT | Performed by: ORTHOPAEDIC SURGERY

## 2021-11-19 RX ORDER — METHYLPREDNISOLONE ACETATE 40 MG/ML
1 INJECTION, SUSPENSION INTRA-ARTICULAR; INTRALESIONAL; INTRAMUSCULAR; SOFT TISSUE
Status: COMPLETED | OUTPATIENT
Start: 2021-11-19 | End: 2021-11-19

## 2021-11-19 RX ORDER — LIDOCAINE HYDROCHLORIDE 10 MG/ML
3 INJECTION, SOLUTION EPIDURAL; INFILTRATION; INTRACAUDAL; PERINEURAL
Status: COMPLETED | OUTPATIENT
Start: 2021-11-19 | End: 2021-11-19

## 2021-11-19 RX ADMIN — LIDOCAINE HYDROCHLORIDE 3 ML: 10 INJECTION, SOLUTION EPIDURAL; INFILTRATION; INTRACAUDAL; PERINEURAL at 11:29

## 2021-11-19 RX ADMIN — METHYLPREDNISOLONE ACETATE 1 ML: 40 INJECTION, SUSPENSION INTRA-ARTICULAR; INTRALESIONAL; INTRAMUSCULAR; SOFT TISSUE at 11:29

## 2021-11-22 ENCOUNTER — TELEPHONE (OUTPATIENT)
Dept: OBGYN CLINIC | Facility: HOSPITAL | Age: 61
End: 2021-11-22

## 2021-11-23 ENCOUNTER — EVALUATION (OUTPATIENT)
Dept: PHYSICAL THERAPY | Facility: REHABILITATION | Age: 61
End: 2021-11-23
Payer: COMMERCIAL

## 2021-11-23 DIAGNOSIS — M17.12 PRIMARY OSTEOARTHRITIS OF LEFT KNEE: ICD-10-CM

## 2021-11-23 PROCEDURE — 97161 PT EVAL LOW COMPLEX 20 MIN: CPT | Performed by: PHYSICAL THERAPIST

## 2021-11-23 PROCEDURE — 97110 THERAPEUTIC EXERCISES: CPT | Performed by: PHYSICAL THERAPIST

## 2021-11-24 ENCOUNTER — OFFICE VISIT (OUTPATIENT)
Dept: PHYSICAL THERAPY | Facility: REHABILITATION | Age: 61
End: 2021-11-24
Payer: COMMERCIAL

## 2021-11-24 DIAGNOSIS — M17.12 PRIMARY OSTEOARTHRITIS OF LEFT KNEE: Primary | ICD-10-CM

## 2021-11-24 PROCEDURE — 97110 THERAPEUTIC EXERCISES: CPT | Performed by: PHYSICAL THERAPIST

## 2021-11-24 PROCEDURE — 97140 MANUAL THERAPY 1/> REGIONS: CPT | Performed by: PHYSICAL THERAPIST

## 2021-12-02 ENCOUNTER — OFFICE VISIT (OUTPATIENT)
Dept: PHYSICAL THERAPY | Facility: REHABILITATION | Age: 61
End: 2021-12-02
Payer: COMMERCIAL

## 2021-12-02 DIAGNOSIS — M17.12 PRIMARY OSTEOARTHRITIS OF LEFT KNEE: Primary | ICD-10-CM

## 2021-12-02 PROCEDURE — 97110 THERAPEUTIC EXERCISES: CPT | Performed by: PHYSICAL THERAPIST

## 2021-12-02 PROCEDURE — 97140 MANUAL THERAPY 1/> REGIONS: CPT | Performed by: PHYSICAL THERAPIST

## 2021-12-07 ENCOUNTER — OFFICE VISIT (OUTPATIENT)
Dept: PHYSICAL THERAPY | Facility: REHABILITATION | Age: 61
End: 2021-12-07
Payer: COMMERCIAL

## 2021-12-07 DIAGNOSIS — M17.12 PRIMARY OSTEOARTHRITIS OF LEFT KNEE: Primary | ICD-10-CM

## 2021-12-07 PROCEDURE — 97110 THERAPEUTIC EXERCISES: CPT | Performed by: PHYSICAL THERAPIST

## 2021-12-07 PROCEDURE — 97112 NEUROMUSCULAR REEDUCATION: CPT | Performed by: PHYSICAL THERAPIST

## 2021-12-09 ENCOUNTER — OFFICE VISIT (OUTPATIENT)
Dept: PHYSICAL THERAPY | Facility: REHABILITATION | Age: 61
End: 2021-12-09
Payer: COMMERCIAL

## 2021-12-09 DIAGNOSIS — M17.12 PRIMARY OSTEOARTHRITIS OF LEFT KNEE: Primary | ICD-10-CM

## 2021-12-09 PROCEDURE — 97140 MANUAL THERAPY 1/> REGIONS: CPT | Performed by: PHYSICAL THERAPIST

## 2021-12-09 PROCEDURE — 97112 NEUROMUSCULAR REEDUCATION: CPT | Performed by: PHYSICAL THERAPIST

## 2021-12-09 PROCEDURE — 97110 THERAPEUTIC EXERCISES: CPT | Performed by: PHYSICAL THERAPIST

## 2021-12-13 ENCOUNTER — OFFICE VISIT (OUTPATIENT)
Dept: OBGYN CLINIC | Facility: MEDICAL CENTER | Age: 61
End: 2021-12-13
Payer: COMMERCIAL

## 2021-12-13 ENCOUNTER — TELEPHONE (OUTPATIENT)
Dept: OBGYN CLINIC | Facility: HOSPITAL | Age: 61
End: 2021-12-13

## 2021-12-13 VITALS
DIASTOLIC BLOOD PRESSURE: 85 MMHG | SYSTOLIC BLOOD PRESSURE: 132 MMHG | WEIGHT: 178.4 LBS | BODY MASS INDEX: 32.83 KG/M2 | HEIGHT: 62 IN

## 2021-12-13 DIAGNOSIS — M23.92 ACUTE INTERNAL DERANGEMENT OF LEFT KNEE: Primary | ICD-10-CM

## 2021-12-13 DIAGNOSIS — M17.12 PRIMARY OSTEOARTHRITIS OF LEFT KNEE: ICD-10-CM

## 2021-12-13 PROCEDURE — 1036F TOBACCO NON-USER: CPT | Performed by: ORTHOPAEDIC SURGERY

## 2021-12-13 PROCEDURE — 3008F BODY MASS INDEX DOCD: CPT | Performed by: ORTHOPAEDIC SURGERY

## 2021-12-13 PROCEDURE — 99214 OFFICE O/P EST MOD 30 MIN: CPT | Performed by: ORTHOPAEDIC SURGERY

## 2021-12-13 RX ORDER — MELOXICAM 15 MG/1
15 TABLET ORAL DAILY
Qty: 28 TABLET | Refills: 0 | Status: SHIPPED | OUTPATIENT
Start: 2021-12-13 | End: 2021-12-14 | Stop reason: SDUPTHER

## 2021-12-14 ENCOUNTER — OFFICE VISIT (OUTPATIENT)
Dept: PHYSICAL THERAPY | Facility: REHABILITATION | Age: 61
End: 2021-12-14
Payer: COMMERCIAL

## 2021-12-14 DIAGNOSIS — M17.12 PRIMARY OSTEOARTHRITIS OF LEFT KNEE: Primary | ICD-10-CM

## 2021-12-14 PROCEDURE — 97112 NEUROMUSCULAR REEDUCATION: CPT | Performed by: PHYSICAL THERAPIST

## 2021-12-14 PROCEDURE — 97530 THERAPEUTIC ACTIVITIES: CPT | Performed by: PHYSICAL THERAPIST

## 2021-12-14 PROCEDURE — 97110 THERAPEUTIC EXERCISES: CPT | Performed by: PHYSICAL THERAPIST

## 2021-12-14 PROCEDURE — 97140 MANUAL THERAPY 1/> REGIONS: CPT | Performed by: PHYSICAL THERAPIST

## 2021-12-17 ENCOUNTER — OFFICE VISIT (OUTPATIENT)
Dept: PHYSICAL THERAPY | Facility: REHABILITATION | Age: 61
End: 2021-12-17
Payer: COMMERCIAL

## 2021-12-17 DIAGNOSIS — M17.12 PRIMARY OSTEOARTHRITIS OF LEFT KNEE: Primary | ICD-10-CM

## 2021-12-17 PROCEDURE — 97112 NEUROMUSCULAR REEDUCATION: CPT | Performed by: PHYSICAL THERAPIST

## 2021-12-17 PROCEDURE — 97110 THERAPEUTIC EXERCISES: CPT | Performed by: PHYSICAL THERAPIST

## 2021-12-17 PROCEDURE — 97140 MANUAL THERAPY 1/> REGIONS: CPT | Performed by: PHYSICAL THERAPIST

## 2021-12-20 ENCOUNTER — OFFICE VISIT (OUTPATIENT)
Dept: PHYSICAL THERAPY | Facility: REHABILITATION | Age: 61
End: 2021-12-20
Payer: COMMERCIAL

## 2021-12-20 DIAGNOSIS — M17.12 PRIMARY OSTEOARTHRITIS OF LEFT KNEE: Primary | ICD-10-CM

## 2021-12-20 PROCEDURE — 97112 NEUROMUSCULAR REEDUCATION: CPT | Performed by: PHYSICAL THERAPIST

## 2021-12-20 PROCEDURE — 97140 MANUAL THERAPY 1/> REGIONS: CPT | Performed by: PHYSICAL THERAPIST

## 2021-12-20 PROCEDURE — 97110 THERAPEUTIC EXERCISES: CPT | Performed by: PHYSICAL THERAPIST

## 2021-12-29 ENCOUNTER — EVALUATION (OUTPATIENT)
Dept: PHYSICAL THERAPY | Facility: REHABILITATION | Age: 61
End: 2021-12-29
Payer: COMMERCIAL

## 2021-12-29 DIAGNOSIS — M17.12 PRIMARY OSTEOARTHRITIS OF LEFT KNEE: Primary | ICD-10-CM

## 2021-12-29 PROCEDURE — 97140 MANUAL THERAPY 1/> REGIONS: CPT | Performed by: PHYSICAL THERAPIST

## 2021-12-29 PROCEDURE — 97110 THERAPEUTIC EXERCISES: CPT | Performed by: PHYSICAL THERAPIST

## 2021-12-30 ENCOUNTER — OFFICE VISIT (OUTPATIENT)
Dept: PHYSICAL THERAPY | Facility: REHABILITATION | Age: 61
End: 2021-12-30
Payer: COMMERCIAL

## 2021-12-30 DIAGNOSIS — M17.12 PRIMARY OSTEOARTHRITIS OF LEFT KNEE: Primary | ICD-10-CM

## 2021-12-30 PROCEDURE — 97112 NEUROMUSCULAR REEDUCATION: CPT | Performed by: PHYSICAL THERAPY ASSISTANT

## 2021-12-30 PROCEDURE — 97110 THERAPEUTIC EXERCISES: CPT | Performed by: PHYSICAL THERAPY ASSISTANT

## 2022-01-20 ENCOUNTER — TELEPHONE (OUTPATIENT)
Dept: OBGYN CLINIC | Facility: HOSPITAL | Age: 62
End: 2022-01-20

## 2022-01-20 NOTE — TELEPHONE ENCOUNTER
Dr Radha Oliva    [phone]    Patient states that she has been doing PT and taking Mobic 15 mg and is feeling better  Pt contacted Call Center requested refill of their medication  Medication Name: meloxicam          Dosage of Med: 15 mg       Frequency of Med:   Sig: Take 1 tablet (15 mg total) by mouth daily                 Remaining Medication: 0    Pharmacy and Location: [place] [address]   Phone:  [phone]  Fax:  [phone]   TANESHA #:  --        Pt  Preferred Callback Phone Number:  [phone]    Thank you

## 2022-03-07 ENCOUNTER — VBI (OUTPATIENT)
Dept: ADMINISTRATIVE | Facility: OTHER | Age: 62
End: 2022-03-07

## 2022-04-15 DIAGNOSIS — E03.9 ACQUIRED HYPOTHYROIDISM: ICD-10-CM

## 2022-04-15 RX ORDER — LEVOTHYROXINE SODIUM 175 UG/1
175 TABLET ORAL DAILY
Qty: 90 TABLET | Refills: 1 | Status: SHIPPED | OUTPATIENT
Start: 2022-04-15

## 2022-08-09 ENCOUNTER — VBI (OUTPATIENT)
Dept: ADMINISTRATIVE | Facility: OTHER | Age: 62
End: 2022-08-09

## 2022-10-28 ENCOUNTER — TELEPHONE (OUTPATIENT)
Dept: FAMILY MEDICINE CLINIC | Facility: CLINIC | Age: 62
End: 2022-10-28

## 2022-10-31 DIAGNOSIS — E03.9 ACQUIRED HYPOTHYROIDISM: Primary | ICD-10-CM

## 2022-11-01 ENCOUNTER — APPOINTMENT (OUTPATIENT)
Dept: LAB | Facility: HOSPITAL | Age: 62
End: 2022-11-01

## 2022-11-01 DIAGNOSIS — E03.9 ACQUIRED HYPOTHYROIDISM: ICD-10-CM

## 2022-11-01 LAB
T4 FREE SERPL-MCNC: 1.38 NG/DL (ref 0.76–1.46)
TSH SERPL DL<=0.05 MIU/L-ACNC: 2.07 UIU/ML (ref 0.45–4.5)

## 2022-11-15 DIAGNOSIS — E03.9 ACQUIRED HYPOTHYROIDISM: ICD-10-CM

## 2022-11-16 RX ORDER — LEVOTHYROXINE SODIUM 175 UG/1
175 TABLET ORAL DAILY
Qty: 90 TABLET | Refills: 0 | Status: SHIPPED | OUTPATIENT
Start: 2022-11-16

## 2022-11-29 ENCOUNTER — VBI (OUTPATIENT)
Dept: ADMINISTRATIVE | Facility: OTHER | Age: 62
End: 2022-11-29

## 2023-01-12 ENCOUNTER — VBI (OUTPATIENT)
Dept: ADMINISTRATIVE | Facility: OTHER | Age: 63
End: 2023-01-12

## 2023-01-12 ENCOUNTER — OFFICE VISIT (OUTPATIENT)
Dept: FAMILY MEDICINE CLINIC | Facility: CLINIC | Age: 63
End: 2023-01-12

## 2023-01-12 VITALS
OXYGEN SATURATION: 99 % | HEART RATE: 96 BPM | TEMPERATURE: 98.7 F | RESPIRATION RATE: 16 BRPM | WEIGHT: 182 LBS | DIASTOLIC BLOOD PRESSURE: 80 MMHG | SYSTOLIC BLOOD PRESSURE: 157 MMHG | BODY MASS INDEX: 33.29 KG/M2

## 2023-01-12 DIAGNOSIS — Z13.1 SCREENING FOR DIABETES MELLITUS: ICD-10-CM

## 2023-01-12 DIAGNOSIS — Z13.220 SCREENING FOR LIPOID DISORDERS: ICD-10-CM

## 2023-01-12 DIAGNOSIS — Z12.31 ENCOUNTER FOR SCREENING MAMMOGRAM FOR BREAST CANCER: ICD-10-CM

## 2023-01-12 DIAGNOSIS — E03.9 ACQUIRED HYPOTHYROIDISM: Primary | ICD-10-CM

## 2023-01-12 DIAGNOSIS — G89.29 CHRONIC PAIN OF RIGHT KNEE: ICD-10-CM

## 2023-01-12 DIAGNOSIS — I10 PRIMARY HYPERTENSION: ICD-10-CM

## 2023-01-12 DIAGNOSIS — M25.561 CHRONIC PAIN OF RIGHT KNEE: ICD-10-CM

## 2023-01-12 RX ORDER — LEVOTHYROXINE SODIUM 175 UG/1
175 TABLET ORAL DAILY
Qty: 90 TABLET | Refills: 0 | Status: SHIPPED | OUTPATIENT
Start: 2023-01-12

## 2023-01-12 NOTE — PROGRESS NOTES
Name: Angel Gregorio      : 1960      MRN: [id number]  Encounter Provider: Siri Abdalla MD  Encounter Date: 2023   Encounter department: [address] [place] [address]     Most recent TSH within normal limits  Will refill levothyroxine 175  Recheck thyroid within the next year  She is due for mammogram, order placed  We will also check a lipid panel and CMP  Blood pressure was elevated at 157/80 today  Patient declines any medication at this time  States that she is asymptomatic as far as chest pain blurred vision or headaches  Finds it difficult to work on exercise due to her knee pain  Recommend patient to follow-up with orthopedics to possibly have injections done  Recommend low-salt diet in the meantime for her elevated blood pressures  Declines GI referral today for colon cancer screening  Also declines HIV and hepatitis C screen  RTC within 1 year for annual physical exam and follow-up  1  Acquired hypothyroidism  -     levothyroxine 175 mcg tablet; Take 1 tablet (175 mcg total) by mouth daily  -     TSH, 3rd generation with Free T4 reflex; Future    2  Encounter for screening mammogram for breast cancer  -     Mammo screening bilateral w 3d & cad; Future; Expected date: 2023    3  Screening for lipoid disorders  -     Lipid Panel with Direct LDL reflex; Future    4  Screening for diabetes mellitus  -     Comprehensive metabolic panel; Future    5  Chronic pain of right knee    6  Primary hypertension         Subjective      She presents today for a refill on her levothyroxine  States that she has been compliant with his medication  She has not been seen here in quite some time  Reports continued bilateral knee pain  She was established with orthopedics and is interested in a new type of injection rather than steroid injection  Review of Systems   Constitutional: Negative for activity change, appetite change, chills, fatigue and fever  HENT: Negative for congestion, rhinorrhea, sneezing and sore throat  Eyes: Negative for pain, discharge, redness and itching  Respiratory: Negative for cough, chest tightness, shortness of breath and wheezing  Cardiovascular: Negative for chest pain and palpitations  Gastrointestinal: Negative for abdominal distention, abdominal pain, constipation, diarrhea, nausea and vomiting  Musculoskeletal: Positive for arthralgias  Negative for back pain, joint swelling and myalgias  Skin: Negative for rash  Neurological: Negative for dizziness, weakness, numbness and headaches  Psychiatric/Behavioral: Negative for dysphoric mood  All other systems reviewed and are negative  No current outpatient medications on file prior to visit  Objective     /80 (BP Location: Right arm, Patient Position: Sitting, Cuff Size: Adult)   Pulse 96   Temp 98 7 °F (37 1 °C) (Temporal)   Resp 16   Wt 82 6 kg (182 lb)   LMP 02/28/2018   SpO2 99%   BMI 33 29 kg/m²     Physical Exam  Constitutional:       General: She is not in acute distress  Appearance: She is well-developed  She is not diaphoretic  HENT:      Head: Normocephalic and atraumatic  Nose: Nose normal  No congestion or rhinorrhea  Mouth/Throat:      Pharynx: No oropharyngeal exudate or posterior oropharyngeal erythema  Eyes:      General: No scleral icterus  Right eye: No discharge  Left eye: No discharge  Conjunctiva/sclera: Conjunctivae normal    Cardiovascular:      Rate and Rhythm: Normal rate and regular rhythm  Pulses: Normal pulses  Heart sounds: Normal heart sounds  No murmur heard  Pulmonary:      Effort: Pulmonary effort is normal  No respiratory distress  Breath sounds: Normal breath sounds  No wheezing  Abdominal:      General: There is no distension  Palpations: Abdomen is soft  Tenderness: There is no abdominal tenderness     Musculoskeletal:         General: No tenderness  Normal range of motion  Skin:     General: Skin is warm and dry  Coloration: Skin is not pale  Findings: No erythema  Neurological:      Mental Status: She is alert     Psychiatric:         Mood and Affect: Mood normal          Behavior: Behavior normal        Ivelisse Perez MD

## 2023-04-19 ENCOUNTER — OFFICE VISIT (OUTPATIENT)
Dept: FAMILY MEDICINE CLINIC | Facility: CLINIC | Age: 63
End: 2023-04-19

## 2023-04-19 VITALS
HEART RATE: 91 BPM | SYSTOLIC BLOOD PRESSURE: 143 MMHG | OXYGEN SATURATION: 98 % | DIASTOLIC BLOOD PRESSURE: 73 MMHG | BODY MASS INDEX: 33.32 KG/M2 | WEIGHT: 182.2 LBS

## 2023-04-19 DIAGNOSIS — K21.9 GASTROESOPHAGEAL REFLUX DISEASE, UNSPECIFIED WHETHER ESOPHAGITIS PRESENT: Primary | ICD-10-CM

## 2023-04-19 DIAGNOSIS — Z12.31 ENCOUNTER FOR SCREENING MAMMOGRAM FOR BREAST CANCER: ICD-10-CM

## 2023-04-19 DIAGNOSIS — Z12.4 CERVICAL CANCER SCREENING: ICD-10-CM

## 2023-04-19 RX ORDER — FAMOTIDINE 20 MG/1
20 TABLET, FILM COATED ORAL 2 TIMES DAILY
Qty: 60 TABLET | Refills: 3 | Status: SHIPPED | OUTPATIENT
Start: 2023-04-19 | End: 2024-04-13

## 2023-04-23 NOTE — PROGRESS NOTES
Assessment/Plan: Treat as likely GERD  Start with Pepcid versus proton pump inhibitor based on potential long-term side effects  Patient will update us in 2 to 3 weeks as to how she is doing  Diet reviewed  If not improving will refer to gastroenterology for further evaluation  If not improving will get ultrasound of the abdomen  due for mammography  Due for Pap  No problem-specific Assessment & Plan notes found for this encounter  Diagnoses and all orders for this visit:    Gastroesophageal reflux disease, unspecified whether esophagitis present  -     famotidine (PEPCID) 20 mg tablet; Take 1 tablet (20 mg total) by mouth 2 (two) times a day    Encounter for screening mammogram for breast cancer  -     Mammo screening bilateral w 3d & cad; Future    Cervical cancer screening  -     Ambulatory Referral to Obstetrics / Gynecology; Future        1  Gastroesophageal reflux disease, unspecified whether esophagitis present  famotidine (PEPCID) 20 mg tablet      2  Encounter for screening mammogram for breast cancer  Mammo screening bilateral w 3d & cad      3  Cervical cancer screening  Ambulatory Referral to Obstetrics / Gynecology          Subjective:        Patient ID: Angela Greenberg is a 65 y o  female  Chief Complaint   Patient presents with   • Abdominal Pain     Patient states pain started 8/10 pain scale on 4/15/2023 and feels like its under diaphragm        Patient known to me for many years  Having GERD symptoms and some upper abdominal discomfort intermittently  Not associated with change in diet  No diarrhea  Timeframe greater than 2 weeks    Abdominal Pain  Pertinent negatives include no constipation, diarrhea, headaches, nausea or vomiting  The following portions of the patient's history were reviewed and updated as appropriate: past medical history, past surgical history and problem list       Review of Systems   Constitutional: Negative for fatigue     Eyes: Negative for visual disturbance  Respiratory: Negative for cough and shortness of breath  Cardiovascular: Negative for chest pain, palpitations and leg swelling  Gastrointestinal: Positive for abdominal pain  Negative for abdominal distention, blood in stool, constipation, diarrhea, nausea and vomiting  Neurological: Negative for dizziness and headaches  Psychiatric/Behavioral: The patient is not nervous/anxious  Objective:  /73 (BP Location: Right arm, Patient Position: Sitting, Cuff Size: Adult)   Pulse 91   Wt 82 6 kg (182 lb 3 2 oz)   LMP 02/28/2018   SpO2 98%   BMI 33 32 kg/m²        Physical Exam  Vitals and nursing note reviewed  Constitutional:       General: She is not in acute distress  HENT:      Head: Normocephalic  Eyes:      General: No scleral icterus  Pupils: Pupils are equal, round, and reactive to light  Cardiovascular:      Rate and Rhythm: Normal rate and regular rhythm  Heart sounds: Normal heart sounds  No murmur heard  Pulmonary:      Breath sounds: Normal breath sounds  Abdominal:      General: Abdomen is flat  Bowel sounds are normal       Palpations: Abdomen is soft  There is no mass  Tenderness: There is no abdominal tenderness  Musculoskeletal:      Right lower leg: No edema  Left lower leg: No edema  Lymphadenopathy:      Cervical: No cervical adenopathy  Skin:     Coloration: Skin is not pale  Neurological:      General: No focal deficit present  Mental Status: She is alert and oriented to person, place, and time  Psychiatric:         Behavior: Behavior normal          Thought Content:  Thought content normal

## 2023-06-05 ENCOUNTER — TELEPHONE (OUTPATIENT)
Dept: FAMILY MEDICINE CLINIC | Facility: CLINIC | Age: 63
End: 2023-06-05

## 2023-06-05 DIAGNOSIS — E03.9 ACQUIRED HYPOTHYROIDISM: ICD-10-CM

## 2023-06-05 RX ORDER — LEVOTHYROXINE SODIUM 175 UG/1
175 TABLET ORAL DAILY
Qty: 90 TABLET | Refills: 0 | Status: SHIPPED | OUTPATIENT
Start: 2023-06-05

## 2023-07-28 DIAGNOSIS — K21.9 GASTROESOPHAGEAL REFLUX DISEASE, UNSPECIFIED WHETHER ESOPHAGITIS PRESENT: ICD-10-CM

## 2023-07-28 RX ORDER — FAMOTIDINE 20 MG/1
20 TABLET, FILM COATED ORAL 2 TIMES DAILY
Qty: 60 TABLET | Refills: 3 | Status: SHIPPED | OUTPATIENT
Start: 2023-07-28 | End: 2024-07-22

## 2023-08-21 ENCOUNTER — OFFICE VISIT (OUTPATIENT)
Dept: FAMILY MEDICINE CLINIC | Facility: CLINIC | Age: 63
End: 2023-08-21
Payer: COMMERCIAL

## 2023-08-21 VITALS
DIASTOLIC BLOOD PRESSURE: 78 MMHG | OXYGEN SATURATION: 99 % | WEIGHT: 180 LBS | BODY MASS INDEX: 33.99 KG/M2 | TEMPERATURE: 96.6 F | HEIGHT: 61 IN | HEART RATE: 96 BPM | SYSTOLIC BLOOD PRESSURE: 128 MMHG

## 2023-08-21 DIAGNOSIS — R05.1 ACUTE COUGH: ICD-10-CM

## 2023-08-21 DIAGNOSIS — J06.9 ACUTE URI: Primary | ICD-10-CM

## 2023-08-21 PROCEDURE — 99214 OFFICE O/P EST MOD 30 MIN: CPT | Performed by: NURSE PRACTITIONER

## 2023-08-21 RX ORDER — BENZONATATE 100 MG/1
100 CAPSULE ORAL 3 TIMES DAILY PRN
Qty: 20 CAPSULE | Refills: 0 | Status: SHIPPED | OUTPATIENT
Start: 2023-08-21

## 2023-08-21 RX ORDER — AMOXICILLIN AND CLAVULANATE POTASSIUM 875; 125 MG/1; MG/1
1 TABLET, FILM COATED ORAL EVERY 12 HOURS SCHEDULED
Qty: 14 TABLET | Refills: 0 | Status: SHIPPED | OUTPATIENT
Start: 2023-08-21 | End: 2023-08-28

## 2023-08-21 NOTE — PATIENT INSTRUCTIONS
Start antibiotic, this is Augmentin, this is one pill twice a day for 7 days. Please take this medication with food as it may upset your stomach. Please review hand out on medication from pharmacy and call if you experience any side effects or have any questions. Stay well hydrated. Start cough medication, this is the Tessalon perles. One pill every 8 hours as needed for cough. Please call the office if you are experiencing any worsening of symptoms or no symptom improvement.

## 2023-08-21 NOTE — PROGRESS NOTES
Name: Lonnie Villareal      : 1960      MRN: [id number]  Encounter Provider: FREDERICK Al  Encounter Date: 2023   Encounter department: [facility] PRIMARY CARE    Assessment & Plan     1. Acute URI  -     amoxicillin-clavulanate (AUGMENTIN) 875-125 mg per tablet; Take 1 tablet by mouth every 12 (twelve) hours for 7 days    2. Acute cough  -     benzonatate (TESSALON PERLES) 100 mg capsule; Take 1 capsule (100 mg total) by mouth 3 (three) times a day as needed for cough    Start antibiotic, this is Augmentin, this is one pill twice a day for 7 days. Please take this medication with food as it may upset your stomach. Please review hand out on medication from pharmacy and call if you experience any side effects or have any questions. Stay well hydrated. Start cough medication, this is the Tessalon perles. One pill every 8 hours as needed for cough. Please call the office if you are experiencing any worsening of symptoms or no symptom improvement. Subjective      Here for evaluation of cold symptoms. Has burning sore throat Thursday. Thought it was allergies. Friday went to work and felt tired. Over the weekend her head feels full/under water. Cough is dry. Feels very exhausted. The cough isn't often but it's bad when it happens. Has some congestion as well and blowing her nose. Taking xyzal and that is helping. Did a home COVID test on Saturday morning. No covid exposure. No recent antibiotics. Review of Systems   Constitutional: Positive for fatigue. Negative for chills and fever. HENT: Positive for congestion, postnasal drip, rhinorrhea and sore throat. Eyes: Negative for discharge. Respiratory: Positive for cough. Negative for shortness of breath. Cardiovascular: Negative for chest pain. Gastrointestinal: Negative for constipation and diarrhea. Genitourinary: Negative for difficulty urinating. Musculoskeletal: Negative for joint swelling. Skin: Negative for rash. Neurological: Negative for headaches. Hematological: Negative for adenopathy. Psychiatric/Behavioral: The patient is not nervous/anxious. Current Outpatient Medications on File Prior to Visit   Medication Sig   • famotidine (PEPCID) 20 mg tablet Take 1 tablet (20 mg total) by mouth 2 (two) times a day   • levothyroxine 175 mcg tablet Take 1 tablet (175 mcg total) by mouth daily       Objective     /78 (BP Location: Left arm, Patient Position: Sitting, Cuff Size: Large)   Pulse 96   Temp (!) 96.6 °F (35.9 °C) (Temporal)   Ht 5' 1" (1.549 m)   Wt 81.6 kg (180 lb)   LMP 02/28/2018   SpO2 99%   BMI 34.01 kg/m²     Physical Exam  Vitals and nursing note reviewed. Constitutional:       General: She is not in acute distress. Appearance: She is well-developed. She is not diaphoretic. HENT:      Head: Normocephalic and atraumatic. Right Ear: External ear normal.      Left Ear: External ear normal. A middle ear effusion is present. Ears:      Comments: Cerumen blocking full view of right TM     Nose: Congestion and rhinorrhea present. Mouth/Throat:      Pharynx: Posterior oropharyngeal erythema present. No oropharyngeal exudate. Eyes:      General: Lids are normal.         Right eye: No discharge. Left eye: No discharge. Conjunctiva/sclera: Conjunctivae normal.   Cardiovascular:      Rate and Rhythm: Normal rate and regular rhythm. Heart sounds: No murmur heard. Pulmonary:      Effort: Pulmonary effort is normal. No respiratory distress. Breath sounds: Normal breath sounds. No wheezing. Musculoskeletal:         General: No deformity. Cervical back: Neck supple. Lymphadenopathy:      Head:      Right side of head: Tonsillar adenopathy present. Left side of head: Tonsillar adenopathy present. Skin:     General: Skin is warm and dry.    Neurological:      Mental Status: She is alert and oriented to person, place, and time. Psychiatric:         Speech: Speech normal.         Behavior: Behavior normal.         Thought Content:  Thought content normal.         Judgment: Judgment normal.       FREDERICK Myers

## 2023-10-02 ENCOUNTER — APPOINTMENT (OUTPATIENT)
Dept: LAB | Facility: HOSPITAL | Age: 63
End: 2023-10-02
Payer: COMMERCIAL

## 2023-10-02 DIAGNOSIS — E03.9 ACQUIRED HYPOTHYROIDISM: ICD-10-CM

## 2023-10-02 LAB — TSH SERPL DL<=0.05 MIU/L-ACNC: 4.48 UIU/ML (ref 0.45–4.5)

## 2023-10-02 PROCEDURE — 84443 ASSAY THYROID STIM HORMONE: CPT

## 2023-10-02 PROCEDURE — 36415 COLL VENOUS BLD VENIPUNCTURE: CPT

## 2023-10-04 DIAGNOSIS — E03.9 ACQUIRED HYPOTHYROIDISM: ICD-10-CM

## 2023-10-04 RX ORDER — LEVOTHYROXINE SODIUM 175 UG/1
175 TABLET ORAL DAILY
Qty: 90 TABLET | Refills: 0 | Status: SHIPPED | OUTPATIENT
Start: 2023-10-04

## 2023-10-23 ENCOUNTER — TELEPHONE (OUTPATIENT)
Dept: FAMILY MEDICINE CLINIC | Facility: CLINIC | Age: 63
End: 2023-10-23

## 2023-10-23 DIAGNOSIS — R10.13 EPIGASTRIC PAIN: Primary | ICD-10-CM

## 2023-10-23 DIAGNOSIS — R10.11 RIGHT UPPER QUADRANT ABDOMINAL PAIN: ICD-10-CM

## 2023-10-23 NOTE — TELEPHONE ENCOUNTER
I have had a stomach episode last Monday and then I had a really bad attack on Saturday. So I think it's time to get an ultrasound done on my stomach because I believe it's the gallbladder but not the Famotidine. It helps and it does help. I don't take it all the time, but I believe that I don't want to go through this pain again. I mean, it was really super bad and it lasted for like 45 minutes. Horrible. I was sweating and vomiting and it just it was not a good thing So my phone number is [phone]. I am on my way to work. My stomach feels like it's been in the battlefield. However, I am able to move and I need to at least try to make it appearance. So if you can give me a call back with further instructions that would be great.

## 2023-10-23 NOTE — TELEPHONE ENCOUNTER
I called and  made the patient aware, her insurance will not allow here to have this completed in a hospital setting so she will call back and let us know where she is going so we can fax her script.

## 2023-10-30 ENCOUNTER — TELEPHONE (OUTPATIENT)
Dept: FAMILY MEDICINE CLINIC | Facility: CLINIC | Age: 63
End: 2023-10-30

## 2023-10-30 DIAGNOSIS — K80.20 CALCULUS OF GALLBLADDER WITHOUT CHOLECYSTITIS WITHOUT OBSTRUCTION: Primary | ICD-10-CM

## 2023-10-30 DIAGNOSIS — R10.11 RUQ PAIN: ICD-10-CM

## 2023-10-30 NOTE — TELEPHONE ENCOUNTER
[facility] Cardiology Refill Guideline reviewed.  Medication meets criteria for refill. Refill sent. Emilia Araujo RN Cardiology September 12, 2022, 9:48 AM       Spoke w/ pt and she states the pain is manageable. Pt would like a referral placed for general surgery. It is very expensive for pt to go the ED. Can you please place referral for pt? Please advise when so that we may call pt.  Thanks

## 2023-11-10 ENCOUNTER — OFFICE VISIT (OUTPATIENT)
Dept: FAMILY MEDICINE CLINIC | Facility: CLINIC | Age: 63
End: 2023-11-10
Payer: COMMERCIAL

## 2023-11-10 VITALS
WEIGHT: 175.6 LBS | HEIGHT: 61 IN | SYSTOLIC BLOOD PRESSURE: 144 MMHG | OXYGEN SATURATION: 96 % | BODY MASS INDEX: 33.15 KG/M2 | DIASTOLIC BLOOD PRESSURE: 77 MMHG | HEART RATE: 83 BPM

## 2023-11-10 DIAGNOSIS — Z00.00 HEALTHCARE MAINTENANCE: Primary | ICD-10-CM

## 2023-11-10 DIAGNOSIS — E78.00 PURE HYPERCHOLESTEROLEMIA: ICD-10-CM

## 2023-11-10 DIAGNOSIS — Z12.11 COLON CANCER SCREENING: ICD-10-CM

## 2023-11-10 DIAGNOSIS — E03.9 ACQUIRED HYPOTHYROIDISM: ICD-10-CM

## 2023-11-10 DIAGNOSIS — Z12.31 ENCOUNTER FOR SCREENING MAMMOGRAM FOR BREAST CANCER: ICD-10-CM

## 2023-11-10 DIAGNOSIS — I10 PRIMARY HYPERTENSION: ICD-10-CM

## 2023-11-10 DIAGNOSIS — R73.03 PREDIABETES: ICD-10-CM

## 2023-11-10 DIAGNOSIS — Z12.4 CERVICAL CANCER SCREENING: ICD-10-CM

## 2023-11-10 PROCEDURE — 99396 PREV VISIT EST AGE 40-64: CPT | Performed by: FAMILY MEDICINE

## 2023-11-13 NOTE — PROGRESS NOTES
Assessment/Plan:   Renny Sherman is a 59 y. o.female who is here for Gallbladder disease         Upon evaluation today patient has: Gallstones on Ultrasound biliary colic       Plan:   Needs colonoscopy update - will follow back will GI as we did review pathology and she is due for colonoscopy   Laparoscopic Cholecystectomy with possible Intraoperative Cholangiogram  Possible Robotic assisted      Post Op Pain Management: Motrin, Oxycontin, and Tylenol      Ultrasound findings: 10/25/23 external imaging showed cholelithiasis without acute cholecystitis. Colonoscopy 2017:  Final Diagnosis   A. Colon, ascending polyp, biopsy:  - Fragments of sessile serrated polyp.  - Negative for high-grade dysplasia. B. Colon, sigmoid polyp, biopsy:  - Tubular adenoma. - Negative for high-grade dysplasia. Preoperative Clearance: None    In preparation for this visit all relevant and known prior office notes, prior consultations, emergency room visits, blood work results, and imaging studies were personally reviewed. A total of  30 minutes was spent reviewing all of this information,caring for this patient, providing differential diagnosis, instructions for management, counseling and coordination of care. This also includes planning surgical intervention where indicated. Patient understands hernia occurrence or re-occurrence risk is higher in a diabetic, tobacco user, with elevated BMIs.     ____________________________________________________    HPI:  Renny Sherman is a 59 y. o.female who was referred for evaluation of Diagnoses of Calculus of gallbladder without cholecystitis without obstruction and RUQ pain were pertinent to this visit. Abdominal pain Location: in the epigastrium and in the RUQ with radiation to back, which is Intermittent  and over 3 months     no nausea and no vomiting,     regular bowel movement. Duration of pain or symptoms: over 3 months      Prior Colonoscopy :  6 Years Ago. Prior Abdominal Surgery: BTL    Anticoagulation: none    Smoking Status: Non-smoker     Imaging:   No orders to display           LABS:  No results found for: "NA", "K", "CL", "CO2", "ANIONGAP", "BUN", "CREATININE", "GLUCOSE", "GLUF", "CALCIUM", "Eddie Beady", "AST", "ALT", "ALKPHOS", "PROT", "BILITOT", "EGFR"    No results found for: "WBC", "HGB", "HCT", "MCV", "PLT"  No results found for: "INR", "PROTIME"  Lab Results   Component Value Date    HGBA1C 5.9 (H) 10/26/2020           ROS:  General ROS: negative for - chills, fatigue, fever or night sweats, weight loss  Respiratory ROS: no cough, shortness of breath, or wheezing  Cardiovascular ROS: no chest pain or dyspnea on exertion  Genito-Urinary ROS: no dysuria, trouble voiding, or hematuria  Musculoskeletal ROS: negative for - gait disturbance, joint pain or muscle pain  Neurological ROS: no TIA or stroke symptoms  Gastrointestinal ROS: See HPI   GI ROS: see HPI  Skin ROS: no new rashes or lesions   Lymphatic ROS: no new adenopathy noted by pt.    GYN ROS: see HPI, no new GYN history or bleeding noted  Psy ROS: no new mental or behavioral disturbances       Patient Active Problem List   Diagnosis    Prediabetes    Pure hypercholesterolemia    Hypothyroidism    Menopausal hot flushes    Cervicitis and endocervicitis    Pain, joint, shoulder    Grieving    Elevated blood-pressure reading, without diagnosis of hypertension    Chronic pain of right knee    Primary hypertension         Allergies:  Codeine, Eggs or egg-derived products - food allergy, Kiwi extract - food allergy, and Latex      Current Outpatient Medications:     famotidine (PEPCID) 20 mg tablet, Take 1 tablet (20 mg total) by mouth 2 (two) times a day, Disp: 60 tablet, Rfl: 3    levothyroxine 175 mcg tablet, Take 1 tablet (175 mcg total) by mouth daily, Disp: 90 tablet, Rfl: 0    Past Medical History:   Diagnosis Date    Abnormal glucose     last assessed: 09/08/16    Abnormal weight gain     last assessed: 07/10/13    Arthritis 11/21    Candidal vulvovaginitis     last assessed: 03/11/14    Cervical polyp     last assessed: 03/11/14    Disease of thyroid gland     hypothyroidism     Hot flashes due to menopause     Spontaneous vaginal delivery     history of two vaginal deliveries    Vitamin D deficiency     last assessed: 09/08/16       Past Surgical History:   Procedure Laterality Date    DENTAL SURGERY      FOOT SURGERY Right 2002    bunion    IN COLONOSCOPY FLX DX W/COLLJ SPEC WHEN PFRMD N/A 5/12/2017    Procedure: COLONOSCOPY;  Surgeon: Raymond Morin MD;  Location: [facility]; Service: Gastroenterology    ROTATOR CUFF REPAIR  2017    Spet, [facility] [place]    TUBAL LIGATION         Family History   Problem Relation Age of Onset    Hypertension Mother         benign essential    Cancer Mother 66        pacreas related    Hypertension Father         benign essential    Migraines Sister     No Known Problems Maternal Grandmother     No Known Problems Maternal Grandfather     No Known Problems Paternal Grandmother     No Known Problems Paternal Grandfather     No Known Problems Maternal Aunt     No Known Problems Paternal Aunt     No Known Problems Paternal Aunt     No Known Problems Paternal Aunt         reports that she has quit smoking. She has never used smokeless tobacco. She reports that she does not currently use alcohol. She reports that she does not use drugs.       Exam:   Vitals:    11/21/23 1003   BP: 160/93   Pulse: 80   Resp: 16   Temp: (!) 97.3 °F (36.3 °C)       PHYSICAL EXAM  General Appearance:    Alert, cooperative, no distress,    Head:    Normocephalic without obvious abnormality   Eyes:    PERRL, conjunctiva/corneas clear,       Neck:   Supple, no adenopathy, no JVD   Back:     Symmetric, no spinal or CVA tenderness   Lungs:     Clear to auscultation bilaterally, no wheezing or rhonchi   Heart:    Regular rate and rhythm, S1 and S2 normal, no murmur   Abdomen:     mild tenderness in the in the RUQ. Extremities:   Extremities normal. No clubbing, cyanosis or edema   Psych:   Normal Affect, AOx3. Neurologic:  Skin:   CNII-XII intact.  Strength symmetric, speech intact    Warm, dry, intact, no visible rashes or lesions        Signature:   Samia Garcia PA-C    Date: 11/21/2023 Time: 10:46 AM

## 2023-11-16 NOTE — PROGRESS NOTES
Assessment/Plan: Health maintenance completed. Blood pressure borderline today. We will continue to monitor. Goal blood pressure would be 130/80 as best as possible. Risk factor modification with regards to obesity. Vaccines discussed. Still working. Recommend yearly mammography. Overdue for colonoscopy and Pap smear. Referrals placed. TSH in normal range. CMP and lipid panel pending but patient does have insurance with high deductible. She will try to get this done before the end of the year. Otherwise recheck every 6 months. Aware that I am retiring. Wish her well good health and happiness. Health Maintenance  Lifestyle Advice: attempt to lose weight and reduce exposure to stress  Diet: limited junk food  Exercise: walks dog daily  Dental: regular dental visits  Vision: no vision problems  Preventative Health: Female Preventative: Does monthly breast self examination    No problem-specific Assessment & Plan notes found for this encounter. Diagnoses and all orders for this visit:    Healthcare maintenance    Primary hypertension    Pure hypercholesterolemia    Acquired hypothyroidism  -     TSH, 3rd generation; Future    Prediabetes    Encounter for screening mammogram for breast cancer  -     Mammo screening bilateral w 3d & cad; Future    Colon cancer screening  -     Ambulatory Referral to Gastroenterology; Future    Cervical cancer screening  -     Ambulatory Referral to Obstetrics / Gynecology; Future                  Subjective:      Patient ID: Kip Morales is a 59 y.o. female. Yearly physical.  Still working. The following portions of the patient's history were reviewed and updated as appropriate: allergies, current medications, past family history, past medical history, past social history, past surgical history and problem list.    Review of Systems   Constitutional:  Negative for appetite change, fatigue, fever and unexpected weight change.    HENT:  Negative for congestion, ear pain, postnasal drip, rhinorrhea, sinus pressure, sinus pain and sore throat. Eyes:  Negative for redness and visual disturbance. Respiratory:  Negative for chest tightness and shortness of breath. Cardiovascular:  Negative for chest pain, palpitations and leg swelling. Gastrointestinal:  Negative for abdominal distention, abdominal pain, diarrhea and nausea. Endocrine: Negative for cold intolerance and heat intolerance. Genitourinary:  Negative for dysuria and hematuria. Musculoskeletal:  Negative for arthralgias, gait problem and myalgias. Skin:  Negative for pallor and rash. Neurological:  Negative for dizziness, tremors, weakness, light-headedness and headaches. Psychiatric/Behavioral:  Negative for behavioral problems. The patient is not nervous/anxious. Objective:    /77 (BP Location: Left arm, Patient Position: Sitting, Cuff Size: Adult)   Pulse 83   Ht 5' 1" (1.549 m)   Wt 79.7 kg (175 lb 9.6 oz)   LMP 02/28/2018   SpO2 96%   BMI 33.18 kg/m²          Physical Exam  Vitals and nursing note reviewed. Constitutional:       General: She is not in acute distress. Appearance: Normal appearance. She is obese. She is not diaphoretic. HENT:      Head: Normocephalic and atraumatic. Eyes:      General: No scleral icterus. Conjunctiva/sclera: Conjunctivae normal.      Pupils: Pupils are equal, round, and reactive to light. Neck:      Thyroid: No thyromegaly. Vascular: No carotid bruit. Cardiovascular:      Rate and Rhythm: Normal rate and regular rhythm. Pulses:           Carotid pulses are 0 on the right side and 0 on the left side. Heart sounds: Normal heart sounds. No murmur heard. Pulmonary:      Effort: Pulmonary effort is normal. No respiratory distress. Breath sounds: Normal breath sounds. No wheezing. Abdominal:      General: There is no distension. Musculoskeletal:      Right lower leg: No edema.       Left lower leg: No edema. Lymphadenopathy:      Cervical: No cervical adenopathy. Skin:     General: Skin is warm. Coloration: Skin is not pale. Neurological:      General: No focal deficit present. Mental Status: She is alert and oriented to person, place, and time. Cranial Nerves: No cranial nerve deficit. Deep Tendon Reflexes: Reflexes are normal and symmetric. Psychiatric:         Mood and Affect: Mood normal.         Behavior: Behavior normal.         Thought Content: Thought content normal.         Judgment: Judgment normal.             Codeine, Eggs or egg-derived products - food allergy, Kiwi extract - food allergy, and Latex    Valentine Grapes had no medications administered during this visit. Health Maintenance   Topic Date Due    Hepatitis C Screening  Never done    COVID-19 Vaccine (1) Never done    HIV Screening  Never done    Breast Cancer Screening: Mammogram  02/27/2021    Cervical Cancer Screening  03/09/2021    PT PLAN OF CARE  01/28/2022    Colorectal Cancer Screening  05/12/2022    Influenza Vaccine (1) 09/01/2023    BMI: Adult  11/10/2024    Annual Physical  11/10/2024    BMI: Followup Plan  11/16/2024    DTaP,Tdap,and Td Vaccines (3 - Td or Tdap) 07/07/2030    Pneumococcal Vaccine: Pediatrics (0 to 5 Years) and At-Risk Patients (6 to 59 Years)  Aged Out    HIB Vaccine  Aged Out    IPV Vaccine  Aged Out    Hepatitis A Vaccine  Aged Out    Meningococcal ACWY Vaccine  Aged Out    HPV Vaccine  Aged Out      Social History     Socioeconomic History    Marital status:       Spouse name: Not on file    Number of children: Not on file    Years of education: Not on file    Highest education level: Not on file   Occupational History    Not on file   Tobacco Use    Smoking status: Former    Smokeless tobacco: Never   Vaping Use    Vaping Use: Never used   Substance and Sexual Activity    Alcohol use: Not Currently     Comment: I do not indulge    Drug use: No    Sexual activity: Not Currently     Partners: Male     Birth control/protection: Female Sterilization     Comment: Im a    Other Topics Concern    Not on file   Social History Narrative    Not on file     Social Determinants of Health     Financial Resource Strain: Not on file   Food Insecurity: Not on file   Transportation Needs: Not on file   Physical Activity: Not on file   Stress: Not on file   Social Connections: Not on file   Intimate Partner Violence: Not on file   Housing Stability: Not on file      Family History   Problem Relation Age of Onset    Hypertension Mother         benign essential    Cancer Mother 66        pacreas related    Hypertension Father         benign essential    Migraines Sister     No Known Problems Maternal Grandmother     No Known Problems Maternal Grandfather     No Known Problems Paternal Grandmother     No Known Problems Paternal Grandfather     No Known Problems Maternal Aunt     No Known Problems Paternal Aunt     No Known Problems Paternal Aunt     No Known Problems Paternal Aunt       Past Medical History:   Diagnosis Date    Abnormal glucose     last assessed: 09/08/16    Abnormal weight gain     last assessed: 07/10/13    Arthritis 11/21    Candidal vulvovaginitis     last assessed: 03/11/14    Cervical polyp     last assessed: 03/11/14    Disease of thyroid gland     hypothyroidism     Hot flashes due to menopause     Spontaneous vaginal delivery     history of two vaginal deliveries    Vitamin D deficiency     last assessed: 09/08/16      has a past surgical history that includes Foot surgery (Right, 2002); pr colonoscopy flx dx w/collj spec when pfrmd (N/A, 5/12/2017); Dental surgery; Rotator cuff repair (2017); and Tubal ligation.    Patient Active Problem List    Diagnosis Date Noted    Chronic pain of right knee 01/12/2023    Primary hypertension 01/12/2023    Grieving 07/08/2020    Elevated blood-pressure reading, without diagnosis of hypertension 07/08/2020    Prediabetes 04/19/2017 Menopausal hot flushes 10/26/2016    Pure hypercholesterolemia 07/17/2014    Pain, joint, shoulder 07/17/2014    Cervicitis and endocervicitis 03/11/2014    Hypothyroidism 06/28/2012       Current Outpatient Medications:     famotidine (PEPCID) 20 mg tablet, Take 1 tablet (20 mg total) by mouth 2 (two) times a day, Disp: 60 tablet, Rfl: 3    levothyroxine 175 mcg tablet, Take 1 tablet (175 mcg total) by mouth daily, Disp: 90 tablet, Rfl: 0              BMI Counseling: Body mass index is 33.18 kg/m². The BMI is above normal. Nutrition recommendations include decreasing portion sizes, encouraging healthy choices of fruits and vegetables, decreasing fast food intake, consuming healthier snacks, limiting drinks that contain sugar, reducing intake of saturated and trans fat and reducing intake of cholesterol. Exercise recommendations include exercising 3-5 times per week. Rationale for BMI follow-up plan is due to patient being overweight or obese.

## 2023-11-21 ENCOUNTER — CONSULT (OUTPATIENT)
Dept: SURGERY | Facility: CLINIC | Age: 63
End: 2023-11-21
Payer: COMMERCIAL

## 2023-11-21 VITALS
RESPIRATION RATE: 16 BRPM | HEART RATE: 80 BPM | BODY MASS INDEX: 32.36 KG/M2 | TEMPERATURE: 97.3 F | DIASTOLIC BLOOD PRESSURE: 93 MMHG | HEIGHT: 61 IN | SYSTOLIC BLOOD PRESSURE: 160 MMHG | WEIGHT: 171.4 LBS

## 2023-11-21 DIAGNOSIS — R10.11 RUQ PAIN: ICD-10-CM

## 2023-11-21 DIAGNOSIS — K80.20 CALCULUS OF GALLBLADDER WITHOUT CHOLECYSTITIS WITHOUT OBSTRUCTION: Primary | ICD-10-CM

## 2023-11-21 PROCEDURE — 99243 OFF/OP CNSLTJ NEW/EST LOW 30: CPT | Performed by: PHYSICIAN ASSISTANT

## 2023-11-21 RX ORDER — SODIUM CHLORIDE, SODIUM LACTATE, POTASSIUM CHLORIDE, CALCIUM CHLORIDE 600; 310; 30; 20 MG/100ML; MG/100ML; MG/100ML; MG/100ML
125 INJECTION, SOLUTION INTRAVENOUS CONTINUOUS
OUTPATIENT
Start: 2023-12-28

## 2023-11-27 ENCOUNTER — OFFICE VISIT (OUTPATIENT)
Dept: FAMILY MEDICINE CLINIC | Facility: CLINIC | Age: 63
End: 2023-11-27
Payer: COMMERCIAL

## 2023-11-27 VITALS
OXYGEN SATURATION: 99 % | HEART RATE: 103 BPM | BODY MASS INDEX: 33.49 KG/M2 | RESPIRATION RATE: 16 BRPM | TEMPERATURE: 98.1 F | HEIGHT: 61 IN | DIASTOLIC BLOOD PRESSURE: 86 MMHG | WEIGHT: 177.4 LBS | SYSTOLIC BLOOD PRESSURE: 142 MMHG

## 2023-11-27 DIAGNOSIS — J06.9 ACUTE URI: Primary | ICD-10-CM

## 2023-11-27 DIAGNOSIS — R05.1 ACUTE COUGH: ICD-10-CM

## 2023-11-27 PROCEDURE — 99214 OFFICE O/P EST MOD 30 MIN: CPT | Performed by: NURSE PRACTITIONER

## 2023-11-27 RX ORDER — FLUTICASONE PROPIONATE 50 MCG
2 SPRAY, SUSPENSION (ML) NASAL DAILY
Qty: 16 G | Refills: 0 | Status: SHIPPED | OUTPATIENT
Start: 2023-11-27

## 2023-11-27 RX ORDER — BENZONATATE 100 MG/1
100 CAPSULE ORAL 3 TIMES DAILY PRN
Qty: 20 CAPSULE | Refills: 0 | Status: SHIPPED | OUTPATIENT
Start: 2023-11-27

## 2023-11-27 RX ORDER — DOXYCYCLINE 100 MG/1
100 TABLET ORAL 2 TIMES DAILY
Qty: 14 TABLET | Refills: 0 | Status: SHIPPED | OUTPATIENT
Start: 2023-11-27 | End: 2023-12-04

## 2023-11-27 NOTE — PROGRESS NOTES
Name: Jenna Blue      : 1960      MRN: [id number]  Encounter Provider: FREDERICK Cheney  Encounter Date: 2023   Encounter department: [facility] PRIMARY CARE    Assessment & Plan     1. Acute URI  -     fluticasone (FLONASE) 50 mcg/act nasal spray; 2 sprays into each nostril daily  -     doxycycline (ADOXA) 100 MG tablet; Take 1 tablet (100 mg total) by mouth 2 (two) times a day for 7 days    2. Acute cough  -     benzonatate (TESSALON PERLES) 100 mg capsule; Take 1 capsule (100 mg total) by mouth 3 (three) times a day as needed for cough  -     doxycycline (ADOXA) 100 MG tablet; Take 1 tablet (100 mg total) by mouth 2 (two) times a day for 7 days    Start Flonase, this is an intranasal corticosteroid. This is 1-2 sprays each nostril once daily. Please be aware that this can cause nasal mucosa irritation. Stop using if you experience any nose bleeds. This can be used for allergy symptoms as well as ear discomfort/pressure. Start cough medication, this is the Tessalon perles. One pill every 8 hours as needed for cough. Start antibiotic, this is doxycycline 100 mg twice daily for 7 days. Take antibiotic with food. If you take any vitamin supplements, try to separate dose by 4-6 hours as this may impair absorption of the antibiotic. Please call the office if you are experiencing any worsening of symptoms or no symptom improvement. If no improvement could add steroids/inhaler. Subjective      Patient presents with:  Cold Like Symptoms: Pt is here for sore throat. Pt had fever on Wednesday but broke on Thursday. Pt is having difficulty swallowing. Using saline rinse. This morning she got a lot of mucous out. Using tessalon perles for cough. Review of Systems   Constitutional:  Negative for chills, fatigue and fever. HENT:  Positive for postnasal drip, sinus pressure, sinus pain and sore throat. Negative for ear pain. Eyes:  Negative for discharge. Respiratory:  Positive for cough (productive today). Negative for shortness of breath. Cardiovascular:  Negative for chest pain. Gastrointestinal:  Negative for constipation and diarrhea. Genitourinary:  Negative for difficulty urinating. Musculoskeletal:  Negative for joint swelling. Skin:  Negative for rash. Neurological:  Positive for headaches. Hematological:  Negative for adenopathy. Psychiatric/Behavioral:  The patient is not nervous/anxious. Current Outpatient Medications on File Prior to Visit   Medication Sig    levothyroxine 175 mcg tablet Take 1 tablet (175 mcg total) by mouth daily    famotidine (PEPCID) 20 mg tablet Take 1 tablet (20 mg total) by mouth 2 (two) times a day (Patient not taking: Reported on 11/27/2023)       Objective     /86   Pulse 103   Temp 98.1 °F (36.7 °C) (Temporal)   Resp 16   Ht 5' 1" (1.549 m)   Wt 80.5 kg (177 lb 6.4 oz)   LMP 02/28/2018   SpO2 99%   BMI 33.52 kg/m²     Physical Exam  Vitals and nursing note reviewed. Constitutional:       General: She is not in acute distress. Appearance: Normal appearance. She is well-developed. She is not diaphoretic. HENT:      Head: Normocephalic and atraumatic. Right Ear: Tympanic membrane, ear canal and external ear normal. There is no impacted cerumen. Left Ear: Tympanic membrane, ear canal and external ear normal. There is no impacted cerumen. Ears:      Comments: Right ear TM partially obscured from cerumen     Nose: Rhinorrhea present. Mouth/Throat:      Pharynx: Posterior oropharyngeal erythema present. No oropharyngeal exudate. Comments: Post nasal drip noted  Eyes:      General: Lids are normal.         Right eye: No discharge. Left eye: No discharge. Conjunctiva/sclera: Conjunctivae normal.   Cardiovascular:      Rate and Rhythm: Normal rate and regular rhythm. Heart sounds: No murmur heard.   Pulmonary:      Effort: Pulmonary effort is normal. No respiratory distress. Breath sounds: Normal breath sounds. No wheezing. Musculoskeletal:         General: No deformity. Skin:     General: Skin is warm and dry. Neurological:      General: No focal deficit present. Mental Status: She is alert and oriented to person, place, and time. Psychiatric:         Speech: Speech normal.         Behavior: Behavior normal.         Thought Content:  Thought content normal.         Judgment: Judgment normal.       FREDERICK Whitten

## 2023-11-27 NOTE — PATIENT INSTRUCTIONS
Start Flonase, this is an intranasal corticosteroid. This is 1-2 sprays each nostril once daily. Please be aware that this can cause nasal mucosa irritation. Stop using if you experience any nose bleeds. This can be used for allergy symptoms as well as ear discomfort/pressure. Start cough medication, this is the Tessalon perles. One pill every 8 hours as needed for cough. Start antibiotic, this is doxycycline 100 mg twice daily for 7 days. Take antibiotic with food. If you take any vitamin supplements, try to separate dose by 4-6 hours as this may impair absorption of the antibiotic. Please call the office if you are experiencing any worsening of symptoms or no symptom improvement.

## 2023-12-01 ENCOUNTER — CLINICAL SUPPORT (OUTPATIENT)
Dept: FAMILY MEDICINE CLINIC | Facility: CLINIC | Age: 63
End: 2023-12-01
Payer: COMMERCIAL

## 2023-12-01 DIAGNOSIS — Z01.818 PRE-OP TESTING: Primary | ICD-10-CM

## 2023-12-01 PROCEDURE — 93000 ELECTROCARDIOGRAM COMPLETE: CPT

## 2023-12-05 ENCOUNTER — APPOINTMENT (OUTPATIENT)
Dept: LAB | Facility: HOSPITAL | Age: 63
End: 2023-12-05
Payer: COMMERCIAL

## 2023-12-05 DIAGNOSIS — K80.20 CALCULUS OF GALLBLADDER WITHOUT CHOLECYSTITIS WITHOUT OBSTRUCTION: ICD-10-CM

## 2023-12-05 LAB
BASOPHILS # BLD AUTO: 0.04 THOUSANDS/ÂΜL (ref 0–0.1)
BASOPHILS NFR BLD AUTO: 1 % (ref 0–1)
EOSINOPHIL # BLD AUTO: 0.09 THOUSAND/ÂΜL (ref 0–0.61)
EOSINOPHIL NFR BLD AUTO: 2 % (ref 0–6)
ERYTHROCYTE [DISTWIDTH] IN BLOOD BY AUTOMATED COUNT: 12.6 % (ref 11.6–15.1)
HCT VFR BLD AUTO: 44.3 % (ref 34.8–46.1)
HGB BLD-MCNC: 14.5 G/DL (ref 11.5–15.4)
IMM GRANULOCYTES # BLD AUTO: 0.01 THOUSAND/UL (ref 0–0.2)
IMM GRANULOCYTES NFR BLD AUTO: 0 % (ref 0–2)
LYMPHOCYTES # BLD AUTO: 1.74 THOUSANDS/ÂΜL (ref 0.6–4.47)
LYMPHOCYTES NFR BLD AUTO: 28 % (ref 14–44)
MCH RBC QN AUTO: 29.3 PG (ref 26.8–34.3)
MCHC RBC AUTO-ENTMCNC: 32.7 G/DL (ref 31.4–37.4)
MCV RBC AUTO: 90 FL (ref 82–98)
MONOCYTES # BLD AUTO: 0.38 THOUSAND/ÂΜL (ref 0.17–1.22)
MONOCYTES NFR BLD AUTO: 6 % (ref 4–12)
NEUTROPHILS # BLD AUTO: 3.92 THOUSANDS/ÂΜL (ref 1.85–7.62)
NEUTS SEG NFR BLD AUTO: 63 % (ref 43–75)
NRBC BLD AUTO-RTO: 0 /100 WBCS
PLATELET # BLD AUTO: 262 THOUSANDS/UL (ref 149–390)
PMV BLD AUTO: 10.2 FL (ref 8.9–12.7)
RBC # BLD AUTO: 4.95 MILLION/UL (ref 3.81–5.12)
WBC # BLD AUTO: 6.18 THOUSAND/UL (ref 4.31–10.16)

## 2023-12-05 PROCEDURE — 85025 COMPLETE CBC W/AUTO DIFF WBC: CPT

## 2023-12-05 PROCEDURE — 36415 COLL VENOUS BLD VENIPUNCTURE: CPT

## 2023-12-14 ENCOUNTER — ANESTHESIA EVENT (OUTPATIENT)
Dept: PERIOP | Facility: HOSPITAL | Age: 63
End: 2023-12-14
Payer: COMMERCIAL

## 2023-12-14 DIAGNOSIS — Z01.818 PREOP TESTING: Primary | ICD-10-CM

## 2023-12-19 ENCOUNTER — APPOINTMENT (OUTPATIENT)
Dept: LAB | Facility: HOSPITAL | Age: 63
End: 2023-12-19
Payer: COMMERCIAL

## 2023-12-19 DIAGNOSIS — Z01.818 PREOP TESTING: ICD-10-CM

## 2023-12-19 DIAGNOSIS — Z13.220 SCREENING FOR LIPOID DISORDERS: ICD-10-CM

## 2023-12-19 DIAGNOSIS — E03.9 ACQUIRED HYPOTHYROIDISM: ICD-10-CM

## 2023-12-19 DIAGNOSIS — Z13.1 SCREENING FOR DIABETES MELLITUS: ICD-10-CM

## 2023-12-19 LAB
ALBUMIN SERPL BCP-MCNC: 4 G/DL (ref 3.5–5)
ALP SERPL-CCNC: 106 U/L (ref 34–104)
ALT SERPL W P-5'-P-CCNC: 32 U/L (ref 7–52)
ANION GAP SERPL CALCULATED.3IONS-SCNC: 4 MMOL/L
AST SERPL W P-5'-P-CCNC: 21 U/L (ref 13–39)
BILIRUB SERPL-MCNC: 0.51 MG/DL (ref 0.2–1)
BUN SERPL-MCNC: 20 MG/DL (ref 5–25)
CALCIUM SERPL-MCNC: 9.3 MG/DL (ref 8.4–10.2)
CHLORIDE SERPL-SCNC: 103 MMOL/L (ref 96–108)
CO2 SERPL-SCNC: 31 MMOL/L (ref 21–32)
CREAT SERPL-MCNC: 0.79 MG/DL (ref 0.6–1.3)
GFR SERPL CREATININE-BSD FRML MDRD: 80 ML/MIN/1.73SQ M
GLUCOSE SERPL-MCNC: 93 MG/DL (ref 65–140)
POTASSIUM SERPL-SCNC: 4.1 MMOL/L (ref 3.5–5.3)
PROT SERPL-MCNC: 6.7 G/DL (ref 6.4–8.4)
SODIUM SERPL-SCNC: 138 MMOL/L (ref 135–147)

## 2023-12-19 PROCEDURE — 80053 COMPREHEN METABOLIC PANEL: CPT

## 2023-12-19 PROCEDURE — 36415 COLL VENOUS BLD VENIPUNCTURE: CPT

## 2023-12-21 NOTE — PRE-PROCEDURE INSTRUCTIONS
Pre-Surgery Instructions:   Medication Instructions    levothyroxine 175 mcg tablet Take day of surgery.     Pt verbalizes understanding of the following:    Please reference your “My Surgical Experience Booklet” for additional information to prepare for your upcoming surgery.      - DO NOT EAT OR DRINK ANYTHING after midnight on the evening before your procedure including coffee, tea, gum or hard candy.    - ONLY SIPS OF WATER with your medications are allowed on the morning of your procedure.  - Avoid OTC non-directed Vit/ Suppl/ Herbals 7 days prior to surgery to ensure no drug interactions with perioperative surgical/ anesthetic meds  - Avoid NSAIDs 3 days prior. Tylenol is ok to take as needed.   - Avoid ASA containing products 5 days prior, unless otherwise instructed by your provider     - Avoid alcohol & cigarette smoking 24hrs before your surgery. Avoid marijuana 12hrs before your surgery.       - Follow the pre surgery showering instructions as listed in the “My Surgical Experience Booklet” or otherwise provided by your surgeon's office.  - Bathing instructions, has chg, neck down, no genitals  - No lotions, powders, sprays, deodorant, perfume, jewelry, body piercings, false lashes or make-up  - Do not use a blade to shave the surgical area 1 week before surgery. It is ok to use clean electric clippers up to 24 hours before surgery. Do not shave any body parts with a razor within 24hrs.  - Do not use dry shampoo, hair spray, hair gel, or any type of hair products.   - Remove nail polish, including gel polish, and any artificial, gel, or acrylic nails if possible.    - For outpatient surgery, arrange for someone to drive you home after the procedure & stay with you until the next morning. Visitor guidelines discussed.   - Bring insurance cards & photo id    - Leave all valuables such as credit cards, money & jewelry at home  - Wear causal clothing that is easy to take on and off. Consider your type of  surgery.    - Notify surgeon if you develop any new illnesses, exposure, develop a rash/ open wounds or have additional questions prior to your surgery.    - Did the surgeon's office give you any other special instructions? No  - Did surgeon require any clearances? No    You will receive a call one business day prior to surgery with an arrival time and hospital directions. If your surgery is scheduled on a Monday, the hospital will be calling you on the Friday prior to your surgery.

## 2023-12-28 ENCOUNTER — ANESTHESIA (OUTPATIENT)
Dept: PERIOP | Facility: HOSPITAL | Age: 63
End: 2023-12-28
Payer: COMMERCIAL

## 2023-12-28 ENCOUNTER — HOSPITAL ENCOUNTER (OUTPATIENT)
Facility: HOSPITAL | Age: 63
Setting detail: OUTPATIENT SURGERY
Discharge: HOME | End: 2023-12-28
Attending: SURGERY | Admitting: SURGERY
Payer: COMMERCIAL

## 2023-12-28 VITALS
OXYGEN SATURATION: 98 % | HEIGHT: 61 IN | RESPIRATION RATE: 16 BRPM | TEMPERATURE: 97.8 F | SYSTOLIC BLOOD PRESSURE: 157 MMHG | BODY MASS INDEX: 32.38 KG/M2 | HEART RATE: 76 BPM | WEIGHT: 171.52 LBS | DIASTOLIC BLOOD PRESSURE: 77 MMHG

## 2023-12-28 DIAGNOSIS — K80.20 CALCULUS OF GALLBLADDER WITHOUT CHOLECYSTITIS WITHOUT OBSTRUCTION: ICD-10-CM

## 2023-12-28 PROCEDURE — 47562 LAPAROSCOPIC CHOLECYSTECTOMY: CPT | Performed by: SURGERY

## 2023-12-28 PROCEDURE — 88304 TISSUE EXAM BY PATHOLOGIST: CPT | Performed by: PATHOLOGY

## 2023-12-28 PROCEDURE — 99024 POSTOP FOLLOW-UP VISIT: CPT | Performed by: SURGERY

## 2023-12-28 PROCEDURE — NC001 PR NO CHARGE: Performed by: PHYSICIAN ASSISTANT

## 2023-12-28 RX ORDER — MIDAZOLAM HYDROCHLORIDE 2 MG/2ML
INJECTION, SOLUTION INTRAMUSCULAR; INTRAVENOUS AS NEEDED
Status: DISCONTINUED | OUTPATIENT
Start: 2023-12-28 | End: 2023-12-28

## 2023-12-28 RX ORDER — INDOCYANINE GREEN AND WATER 25 MG
7.5 KIT INJECTION ONCE
Status: COMPLETED | OUTPATIENT
Start: 2023-12-28 | End: 2023-12-28

## 2023-12-28 RX ORDER — MAGNESIUM HYDROXIDE 1200 MG/15ML
LIQUID ORAL AS NEEDED
Status: DISCONTINUED | OUTPATIENT
Start: 2023-12-28 | End: 2023-12-28 | Stop reason: HOSPADM

## 2023-12-28 RX ORDER — DEXAMETHASONE SODIUM PHOSPHATE 10 MG/ML
INJECTION, SOLUTION INTRAMUSCULAR; INTRAVENOUS AS NEEDED
Status: DISCONTINUED | OUTPATIENT
Start: 2023-12-28 | End: 2023-12-28

## 2023-12-28 RX ORDER — ROCURONIUM BROMIDE 10 MG/ML
INJECTION, SOLUTION INTRAVENOUS AS NEEDED
Status: DISCONTINUED | OUTPATIENT
Start: 2023-12-28 | End: 2023-12-28

## 2023-12-28 RX ORDER — PROPOFOL 10 MG/ML
INJECTION, EMULSION INTRAVENOUS AS NEEDED
Status: DISCONTINUED | OUTPATIENT
Start: 2023-12-28 | End: 2023-12-28

## 2023-12-28 RX ORDER — CEFAZOLIN SODIUM 1 G/50ML
1000 SOLUTION INTRAVENOUS ONCE
Status: COMPLETED | OUTPATIENT
Start: 2023-12-28 | End: 2023-12-28

## 2023-12-28 RX ORDER — LIDOCAINE HYDROCHLORIDE AND EPINEPHRINE 10; 10 MG/ML; UG/ML
INJECTION, SOLUTION INFILTRATION; PERINEURAL AS NEEDED
Status: DISCONTINUED | OUTPATIENT
Start: 2023-12-28 | End: 2023-12-28 | Stop reason: HOSPADM

## 2023-12-28 RX ORDER — MORPHINE SULFATE 10 MG/ML
2 INJECTION, SOLUTION INTRAMUSCULAR; INTRAVENOUS
Status: DISCONTINUED | OUTPATIENT
Start: 2023-12-28 | End: 2023-12-28 | Stop reason: HOSPADM

## 2023-12-28 RX ORDER — OXYCODONE HYDROCHLORIDE AND ACETAMINOPHEN 5; 325 MG/1; MG/1
2 TABLET ORAL EVERY 6 HOURS PRN
Status: DISCONTINUED | OUTPATIENT
Start: 2023-12-28 | End: 2023-12-28 | Stop reason: HOSPADM

## 2023-12-28 RX ORDER — ONDANSETRON 2 MG/ML
INJECTION INTRAMUSCULAR; INTRAVENOUS AS NEEDED
Status: DISCONTINUED | OUTPATIENT
Start: 2023-12-28 | End: 2023-12-28

## 2023-12-28 RX ORDER — FENTANYL CITRATE/PF 50 MCG/ML
50 SYRINGE (ML) INJECTION
Status: DISCONTINUED | OUTPATIENT
Start: 2023-12-28 | End: 2023-12-28 | Stop reason: HOSPADM

## 2023-12-28 RX ORDER — OXYCODONE HYDROCHLORIDE AND ACETAMINOPHEN 5; 325 MG/1; MG/1
1 TABLET ORAL EVERY 4 HOURS PRN
Status: DISCONTINUED | OUTPATIENT
Start: 2023-12-28 | End: 2023-12-28 | Stop reason: HOSPADM

## 2023-12-28 RX ORDER — HYDROMORPHONE HCL/PF 1 MG/ML
0.5 SYRINGE (ML) INJECTION
Status: DISCONTINUED | OUTPATIENT
Start: 2023-12-28 | End: 2023-12-28 | Stop reason: HOSPADM

## 2023-12-28 RX ORDER — SODIUM CHLORIDE 9 MG/ML
125 INJECTION, SOLUTION INTRAVENOUS CONTINUOUS
Status: DISCONTINUED | OUTPATIENT
Start: 2023-12-28 | End: 2023-12-28 | Stop reason: HOSPADM

## 2023-12-28 RX ORDER — OXYCODONE HYDROCHLORIDE 5 MG/1
5 TABLET ORAL EVERY 4 HOURS PRN
Qty: 12 TABLET | Refills: 0 | Status: SHIPPED | OUTPATIENT
Start: 2023-12-28 | End: 2024-01-07

## 2023-12-28 RX ORDER — FENTANYL CITRATE 50 UG/ML
INJECTION, SOLUTION INTRAMUSCULAR; INTRAVENOUS AS NEEDED
Status: DISCONTINUED | OUTPATIENT
Start: 2023-12-28 | End: 2023-12-28

## 2023-12-28 RX ORDER — SODIUM CHLORIDE 9 MG/ML
INJECTION, SOLUTION INTRAVENOUS AS NEEDED
Status: DISCONTINUED | OUTPATIENT
Start: 2023-12-28 | End: 2023-12-28 | Stop reason: HOSPADM

## 2023-12-28 RX ORDER — LIDOCAINE HYDROCHLORIDE 10 MG/ML
INJECTION, SOLUTION EPIDURAL; INFILTRATION; INTRACAUDAL; PERINEURAL AS NEEDED
Status: DISCONTINUED | OUTPATIENT
Start: 2023-12-28 | End: 2023-12-28

## 2023-12-28 RX ORDER — ONDANSETRON 2 MG/ML
4 INJECTION INTRAMUSCULAR; INTRAVENOUS ONCE AS NEEDED
Status: DISCONTINUED | OUTPATIENT
Start: 2023-12-28 | End: 2023-12-28 | Stop reason: HOSPADM

## 2023-12-28 RX ORDER — SODIUM CHLORIDE, SODIUM LACTATE, POTASSIUM CHLORIDE, CALCIUM CHLORIDE 600; 310; 30; 20 MG/100ML; MG/100ML; MG/100ML; MG/100ML
125 INJECTION, SOLUTION INTRAVENOUS CONTINUOUS
Status: DISCONTINUED | OUTPATIENT
Start: 2023-12-28 | End: 2023-12-28 | Stop reason: HOSPADM

## 2023-12-28 RX ORDER — LABETALOL HYDROCHLORIDE 5 MG/ML
10 INJECTION, SOLUTION INTRAVENOUS
Status: COMPLETED | OUTPATIENT
Start: 2023-12-28 | End: 2023-12-28

## 2023-12-28 RX ADMIN — LIDOCAINE HYDROCHLORIDE 80 MG: 10 INJECTION, SOLUTION EPIDURAL; INFILTRATION; INTRACAUDAL; PERINEURAL at 07:44

## 2023-12-28 RX ADMIN — PROPOFOL 150 MG: 10 INJECTION, EMULSION INTRAVENOUS at 07:44

## 2023-12-28 RX ADMIN — OXYCODONE AND ACETAMINOPHEN 1 TABLET: 5; 325 TABLET ORAL at 11:19

## 2023-12-28 RX ADMIN — SUGAMMADEX 200 MG: 100 INJECTION, SOLUTION INTRAVENOUS at 08:44

## 2023-12-28 RX ADMIN — Medication 10 MG: at 10:37

## 2023-12-28 RX ADMIN — CEFAZOLIN SODIUM 1000 MG: 1 SOLUTION INTRAVENOUS at 07:47

## 2023-12-28 RX ADMIN — INDOCYANINE GREEN AND WATER 7.5 MG: KIT at 07:51

## 2023-12-28 RX ADMIN — FENTANYL CITRATE 25 MCG: 50 INJECTION INTRAMUSCULAR; INTRAVENOUS at 08:11

## 2023-12-28 RX ADMIN — Medication 10 MG: at 10:18

## 2023-12-28 RX ADMIN — FENTANYL CITRATE 50 MCG: 0.05 INJECTION, SOLUTION INTRAMUSCULAR; INTRAVENOUS at 09:17

## 2023-12-28 RX ADMIN — HYDROMORPHONE HYDROCHLORIDE 0.5 MG: 1 INJECTION, SOLUTION INTRAMUSCULAR; INTRAVENOUS; SUBCUTANEOUS at 10:41

## 2023-12-28 RX ADMIN — ROCURONIUM BROMIDE 50 MG: 10 INJECTION, SOLUTION INTRAVENOUS at 07:44

## 2023-12-28 RX ADMIN — SODIUM CHLORIDE, SODIUM LACTATE, POTASSIUM CHLORIDE, AND CALCIUM CHLORIDE: .6; .31; .03; .02 INJECTION, SOLUTION INTRAVENOUS at 08:00

## 2023-12-28 RX ADMIN — ONDANSETRON 4 MG: 2 INJECTION INTRAMUSCULAR; INTRAVENOUS at 08:39

## 2023-12-28 RX ADMIN — FENTANYL CITRATE 50 MCG: 50 INJECTION INTRAMUSCULAR; INTRAVENOUS at 07:44

## 2023-12-28 RX ADMIN — SODIUM CHLORIDE, SODIUM LACTATE, POTASSIUM CHLORIDE, AND CALCIUM CHLORIDE 125 ML/HR: .6; .31; .03; .02 INJECTION, SOLUTION INTRAVENOUS at 05:50

## 2023-12-28 RX ADMIN — DEXAMETHASONE SODIUM PHOSPHATE 8 MG: 10 INJECTION INTRAMUSCULAR; INTRAVENOUS at 07:52

## 2023-12-28 RX ADMIN — FENTANYL CITRATE 50 MCG: 0.05 INJECTION, SOLUTION INTRAMUSCULAR; INTRAVENOUS at 09:33

## 2023-12-28 RX ADMIN — MIDAZOLAM 2 MG: 1 INJECTION INTRAMUSCULAR; INTRAVENOUS at 07:38

## 2023-12-28 NOTE — ANESTHESIA PREPROCEDURE EVALUATION
Procedure:  CHOLECYSTECTOMY  W/ ROBOT (Abdomen)    Relevant Problems   CARDIO   (+) Primary hypertension   (+) Pure hypercholesterolemia      ENDO   (+) Hypothyroidism      Other   (+) Prediabetes        Physical Exam    Airway    Mallampati score: II  TM Distance: >3 FB  Neck ROM: full     Dental       Cardiovascular  Rhythm: regular, Rate: normal, Cardiovascular exam normal    Pulmonary  Pulmonary exam normal Breath sounds clear to auscultation    Other Findings  post-pubertal.      Anesthesia Plan  ASA Score- 3     Anesthesia Type- general with ASA Monitors.         Additional Monitors:     Airway Plan: ETT.           Plan Factors-Exercise tolerance (METS): >4 METS.    Chart reviewed.   Existing labs reviewed.     Patient is not a current smoker.      Obstructive sleep apnea risk education given perioperatively.        Induction- intravenous.    Postoperative Plan-     Informed Consent- Anesthetic plan and risks discussed with patient.

## 2023-12-28 NOTE — ANESTHESIA POSTPROCEDURE EVALUATION
"Post-Op Assessment Note    CV Status:  Stable    Pain management: adequate       Mental Status:  Alert and awake   Hydration Status:  Euvolemic   PONV Controlled:  Controlled   Airway Patency:  Patent     Post Op Vitals Reviewed: Yes      Staff: Anesthesiologist               BP      Temp      Pulse     Resp      SpO2      /77   Pulse 76   Temp 97.8 °F (36.6 °C) (Temporal)   Resp 16   Ht 5' 1\" (1.549 m)   Wt 77.8 kg (171 lb 8.3 oz)   LMP 02/28/2018   SpO2 98%   BMI 32.41 kg/m²     "

## 2023-12-28 NOTE — OP NOTE
OPERATIVE REPORT  PATIENT NAME: Shanna Parker    :  1960  MRN: [id number]  Pt Location: [facility] OR ROOM 08    SURGERY DATE: 2023    Surgeons and Role:     * Jeff Graham MD - Primary     * Rosy Almeida PA-C - Assisting     * Anuel Barnes DO - Assisting    Preop Diagnosis:  Calculus of gallbladder without cholecystitis without obstruction [K80.20]    Post-Op Diagnosis Codes:     * Calculus of gallbladder without cholecystitis without obstruction [K80.20]    Procedure(s):  CHOLECYSTECTOMY  W/ ROBOT    Specimen(s):  ID Type Source Tests Collected by Time Destination   1 : Gallbladder Tissue Gallbladder TISSUE EXAM Jeff Graham MD 2023 0822        Estimated Blood Loss:   Minimal    Drains:  * No LDAs found *    Anesthesia Type:   General    Operative Indications:  Calculus of gallbladder without cholecystitis without obstruction [K80.20]      Operative Findings:  Chronic calculus cholecystitis    Complications:   None    Procedure and Technique:  The patient was seen again in the Holding Room. The risks, benefits, complications, treatment options, and expected outcomes were discussed with the patient. The possibilities of reaction to medication, pulmonary aspiration, perforation of viscus, bleeding, recurrent infection, finding a normal gallbladder, the need for additional procedures, failure to diagnose a condition, the possible need to convert to an open procedure, and creating a complication requiring transfusion or operation were discussed with the patient. The patient and/or family concurred with the proposed plan, giving informed consent. The site of surgery properly noted/marked. The patient was taken to Operating Room, identified as Shanna Parker  and the procedure verified as Laparoscopic Cholecystectomy with possible Intraoperative Cholangiogram. A Time Out was held after prepping and draping in sterile fashion.  The above information was confirmed.    Prior to the induction of  general anesthesia, antibiotic prophylaxis was administered. General endotracheal anesthesia was then administered and tolerated well. After the induction, the abdomen was prepped in the usual sterile fashion. The patient was positioned in the supine position, along with some reverse Trendelenburg.    Local anesthetic agent was injected into the skin near the umbilicus and an incision made. The midline fascia was incised and the open technique was used to introduce a  port under direct vision.  Pneumoperitoneum was then created with CO2 and was tolerated well without any adverse changes in the patient's vital signs. Additional trocars were introduced under direct vision. All skin incisions were infiltrated with a local anesthetic agent before making the incision and placing the trocars.  The patient was placed in reverse Trendelenburg position. The two  8 mm robotic trocars were placed at least 10 cm from the camera port. A 5 mm system port was placed on the lateral right side.    The gallbladder was identified, the fundus grasped and retracted cephalad. Adhesions were lysed bluntly and with the electrocautery where indicated, taking care not to injure any adjacent organs or viscus. The infundibulum was grasped and retracted laterally, exposing the peritoneum overlying the triangle of Calot. This was then dissected anteriorily and posteriorly and exposed in a blunt fashion or using cautery where appropriate. The cystic duct was clearly identified and  dissected circumferentially, as was the cystic artery, as the only two tubular structures leading into the gallbladder .  The critical view of the [place] of Calot was identified.  The posterior aspect of the gallbladder was lifted off the cystic plate, to insure that there were no posterior structres behind the gallbladder.      Once this was all clearly identified, the cystic duct was then doubly ligated with clips on the patient's side and 1 on the gallbladder  side.  The cystic artery was then also clipped and transected.    The gallbladder was dissected from the liver bed in retrograde fashion with the electrocautery.  The robot was then undocked and a 5 mm camera was placed back in. The gallbladder was placed into an endocatch bag and secured.  The liver bed was irrigated and inspected. Hemostasis was achieved with the electrocautery. Copious irrigation was utilized and was repeatedly aspirated until clear.    The camera was then switched to the lateral port and directed back to the midline. The specimen was removed under direct visualization from the midline incision.  The fascia was then closed using the renfac suture passer and a figure of eight 0 vicryl suture.  Pneumoperitoneum was completely reduced after viewing removal of the trocars under direct vision.  The wound was thoroughly irrigated. The skin was then closed with 4-0 monocryl and histacryl.    Instrument, sponge, and needle counts were correct at closure and at the conclusion of the case.      PA is medically necessary for the surgical safety of the case, including suturing, retracting, and hemostasis.    I was present for the entire procedure.    Patient Disposition:  PACU         SIGNATURE: Jeff Graham MD  DATE: December 28, 2023  TIME: 8:42 AM

## 2023-12-28 NOTE — DISCHARGE INSTR - AVS FIRST PAGE
Post-Operative Care Instructions  Dr. Jeff Graham MD, [facility]  [phone]    1. General: You will feel pulling sensations around the wound or funny aches and pains around the incisions. This is normal. Even minor surgery is a change in your body and this is your body’s way of reaction to it. If you have had abdominal surgery, it may help to support the incision with a small pillow or blanket for comfort when moving or coughing.    2. Wound care:  Okay to shower.  The glue will fall off over the next week or 2.    3. Water: You may shower over the wound, unless there are drain tubes left in place. Do not bathe or use a pool or hot tub until cleared by the physician.    4. Activity: You may go up and down stairs, walk as much as you are comfortable, but walk at least 3 times each day. If you have had abdominal surgery, do not lift anything heavier than 15 pounds for at least 2-4 weeks, unless cleared by the doctor.    5. Diet: You may resume a regular diet. If you had a same-day surgery or overnight stay surgery, he may wish to eat lightly for a few days: soups, crackers, and sandwiches. You may resume a regular diet when ready.    6. Medications: Resume all of your previous medications, unless told otherwise by the doctor. Avoid aspirin or ibuprofen (Advil, Motrin, etc.) products for 2-3 days after the date of surgery. You may, at that time, began to take them again. Tylenol is always fine, unless you are taking any narcotic pain medication containing Tylenol (such as Percocet, Darvocet, Vicodin, or anything containing acetaminophen). Do not take Tylenol if you're taking these medications. You do not need to take the narcotic pain medications unless you are having significant pain and discomfort.    7. Driving: You will need someone to drive you home on the day of surgery. Do not drive or make any important decisions while on narcotic pain medication or 24 hours and after anesthesia or sedation for surgery.  Generally, you may drive when your off all narcotic pain medications.    8. Upset Stomach: You may take Maalox, Tums, or similar items for an upset stomach. If your narcotic pain medication causes an upset stomach, do not take it on an empty stomach. Try taking it with at least some crackers or toast.     9. Constipation: Patients often experienced constipation after surgery. You may take over-the-counter medication for this, such as Metamucil, Senokot, Dulcolax, milk of magnesia, etc. You may take a suppository unless you have had anorectal surgery such as a procedure on your hemorrhoids. If you experience significant nausea or vomiting after abdominal surgery, call the office before trying any of these medications.    10. Call the office: If you are experiencing any of the following, fevers above 101.5°, significant nausea or vomiting, if the wound develops drainage and/or is excessive redness around the wound, or if you have significant diarrhea or other worsening symptoms.    11. Pain: You may be given a prescription for pain. This will be given to the hospital, the day of surgery.    12. Sexual Activity: You may resume sexual activity when you feel ready and comfortable and your incision is sealed and healed without apparent infection risk.    13. Urination: If you haven't urinated in 6 hours, go directly to the ER for evaluation for urinary retention.     14. Follow-up in 2 weeks.

## 2024-01-02 ENCOUNTER — TELEPHONE (OUTPATIENT)
Dept: SURGERY | Facility: CLINIC | Age: 64
End: 2024-01-02

## 2024-01-02 PROCEDURE — 88304 TISSUE EXAM BY PATHOLOGIST: CPT | Performed by: PATHOLOGY

## 2024-01-02 NOTE — TELEPHONE ENCOUNTER
Patient called to state she rash a rash that started on 12/31/23. Is under left axilla, left breast and down trunk.Not painful or itchy.Has been using hydrocortisone cream to areas. Had laparoscopic cholecystectomy done on 12/28/23. If rash worsens, call office or PCP, possibly shingles. Patient verbalized understanding.

## 2024-01-10 ENCOUNTER — OFFICE VISIT (OUTPATIENT)
Dept: SURGERY | Facility: CLINIC | Age: 64
End: 2024-01-10

## 2024-01-10 ENCOUNTER — APPOINTMENT (OUTPATIENT)
Dept: LAB | Facility: HOSPITAL | Age: 64
End: 2024-01-10
Payer: COMMERCIAL

## 2024-01-10 VITALS
SYSTOLIC BLOOD PRESSURE: 122 MMHG | BODY MASS INDEX: 32.47 KG/M2 | DIASTOLIC BLOOD PRESSURE: 84 MMHG | HEART RATE: 97 BPM | TEMPERATURE: 97.5 F | RESPIRATION RATE: 16 BRPM | WEIGHT: 172 LBS | HEIGHT: 61 IN

## 2024-01-10 DIAGNOSIS — Z09 POSTOP CHECK: Primary | ICD-10-CM

## 2024-01-10 LAB — TSH SERPL DL<=0.05 MIU/L-ACNC: 2.57 UIU/ML (ref 0.45–4.5)

## 2024-01-10 PROCEDURE — 84443 ASSAY THYROID STIM HORMONE: CPT

## 2024-01-10 PROCEDURE — 36415 COLL VENOUS BLD VENIPUNCTURE: CPT

## 2024-01-10 PROCEDURE — 99024 POSTOP FOLLOW-UP VISIT: CPT | Performed by: SURGERY

## 2024-01-10 NOTE — PROGRESS NOTES
"GENERAL SURGERY POST-OP NOTE  Shanna Parker   MRN: [id number]   : 1960  1/10/2024  Chief Complaint   Patient presents with    Post-op     Patient is here today following a Robotic Laparoscopic cholecystectomy 23 with .     Rash     Has developed a rash under her left breast and bilateral shoulders.      Assessment/Plan   There are no diagnoses linked to this encounter.  Patient is doing well status post-operative laparoscopic cholecystectomy.  Post-operative care restrictions discussed. May return to work when ready.    she is progressing nicely.  I will see her back on an as needed basis.  She has a rash on her left upper abdomen under the breast that has been to the steroid cream.  She feels it is improving.  I explained that if it does not improve she needs to call for reevaluation.  I do recommend trying maybe Benadryl cream as an antihistamine.  Subjective   The patient is a 63 y.o. female who presents for routine post-operative follow up status post  cholecystectomy.  She denies any present complaints. Activity level has been appropriate. She is tolerating a regular diet. Pain is well-controlled with current therapy.    The following portions of the patient's history were reviewed by a provider in this encounter and updated as appropriate:    No text in SmartText           Objective   Physical Exam:  /84 (BP Location: Left arm, Patient Position: Sitting, Cuff Size: Large)   Pulse 97   Temp 97.5 °F (36.4 °C) (Tympanic)   Resp 16   Ht 5' 1\" (1.549 m)   Wt 78 kg (172 lb)   LMP 2018   BMI 32.50 kg/m²   Constitutional: not in acute distress, well developed and well nourished  Abdomen: soft, bowel sounds active, non-tender, no abnormal masses  Incision: healing well, no drainage, no erythema, well approximated  Tenderness at incision site: mild    Current Outpatient Medications   Medication Sig Dispense Refill    levothyroxine 175 mcg tablet Take 1 tablet (175 mcg total) by " mouth daily 90 tablet 0     No current facility-administered medications for this visit.     Codeine, Eggs or egg-derived products - food allergy, Kiwi extract - food allergy, and Latex   Past Medical History:   Diagnosis Date    Abnormal glucose     last assessed: 09/08/16    Abnormal weight gain     last assessed: 07/10/13    Arthritis 11/21    Candidal vulvovaginitis     last assessed: 03/11/14    Cervical polyp     last assessed: 03/11/14    COVID 2020    Disease of thyroid gland     hypothyroidism     Eczema     RLE    Hot flashes due to menopause     Spontaneous vaginal delivery     history of two vaginal deliveries    Vitamin D deficiency     last assessed: 09/08/16       Jeff Graham MD

## 2024-01-15 DIAGNOSIS — E03.9 ACQUIRED HYPOTHYROIDISM: ICD-10-CM

## 2024-01-15 RX ORDER — LEVOTHYROXINE SODIUM 175 UG/1
175 TABLET ORAL DAILY
Qty: 90 TABLET | Refills: 1 | Status: SHIPPED | OUTPATIENT
Start: 2024-01-15

## 2024-01-15 NOTE — TELEPHONE ENCOUNTER
Reason for call:   [x] Refill   [] Prior Auth  [] Other:     Office:   [x] PCP/Provider -   [] Specialty/Provider -     Medication:     levothyroxine       Dose/Frequency:     175 mcg, Oral, Daily       Quantity: 90    Pharmacy: [facility] [place] - [place] [facility] - [address] [place] [address]     Does the patient have enough for 3 days?   [] Yes   [x] No - Send as HP to POD

## 2024-01-17 NOTE — PROGRESS NOTES
[facility] [address], [place], [place]    ASSESSMENT/PLAN: Shanna Parker is a 63 y.o.  who presents for annual gynecologic exam.    Encounter for routine gynecologic examination  - Routine well woman exam completed today.  - Cervical Cancer Screening: Current ASCCP Guidelines reviewed. Last Pap: 2018 normal. Next Pap Due: today  - HPV Vaccination status: Not immunized  - STI screening offered including HIV: not indicated based on hx or requested at time of visit  - Breast Cancer Screening: Last Mammogram 2020, script given  - Colorectal cancer screening was not ordered.  - The following were reviewed in today's visit: breast self exam, mammography screening ordered, adequate intake of calcium and vitamin D, and exercise  - RTO 1 yr    Additional problems addressed at this visit:  1. Encounter for annual routine gynecological examination  -     Liquid-based pap, screening  -     Mammo screening bilateral w 3d & cad; Future; Expected date: 2024    2. Cervical cancer screening  -     Ambulatory Referral to Obstetrics / Gynecology  -     Liquid-based pap, screening    3. Encounter for screening mammogram for breast cancer  -     Mammo screening bilateral w 3d & cad; Future; Expected date: 2024          CC:  Annual Gynecologic Examination    HPI: Shanan Parker is a 63 y.o.  who presents for annual gynecologic examination.  Shanna presents today for gyn exam. No vaginal bleeding since onset of menopause. 2018 last pap smear- normal, Hx of abnormal pap smear no.  mammogram- normal, and 2017 colonoscopy- repeat 5 yrs. Reports 4.5- 7 hrs of sleep daily- interrupted hours of sleep, starting to introduce dairy since gallbladder surgery calcium rich foods daily- slowly introducing supplements. Exercises: recent surgery- walks dog 3 times per day- 30-40 minutes per day. 1-2 servings of caffeine daily. Performs SBE. Safe at home- yes. Wears seatbelt -  "yes Concerns: notes some urinary frequency in the evening and at night, but does not have to go during the day at work.        The following portions of the patient's history were reviewed and updated as appropriate: allergies, current medications, past family history, past medical history, obstetric history, gynecologic history, past social history, past surgical history and problem list.    Review of Systems   Constitutional:  Positive for chills. Negative for fatigue and fever.   Respiratory:  Positive for cough. Negative for shortness of breath.         Has cold and cough started with onset   Cardiovascular:  Negative for chest pain, palpitations and leg swelling.   Gastrointestinal:  Positive for constipation. Negative for diarrhea.   Genitourinary:  Positive for frequency. Negative for difficulty urinating, dysuria, pelvic pain, urgency, vaginal bleeding, vaginal discharge and vaginal pain.   Neurological:  Negative for light-headedness and headaches.   Psychiatric/Behavioral:  The patient is not nervous/anxious.          Objective:  /86   Ht 5' 1\" (1.549 m)   Wt 77.2 kg (170 lb 4.8 oz)   LMP 02/28/2018   BMI 32.18 kg/m²    Physical Exam  Vitals reviewed.   Constitutional:       Appearance: Normal appearance.   HENT:      Head: Normocephalic.   Neck:      Thyroid: No thyroid mass or thyroid tenderness.   Cardiovascular:      Rate and Rhythm: Normal rate and regular rhythm.      Heart sounds: Normal heart sounds.   Pulmonary:      Effort: Pulmonary effort is normal.      Breath sounds: Normal breath sounds.   Chest:   Breasts:     Right: No mass, nipple discharge, skin change or tenderness.      Left: No mass, nipple discharge, skin change or tenderness.   Abdominal:      General: There is no distension.      Palpations: There is no mass.      Tenderness: There is no abdominal tenderness. There is no guarding.   Genitourinary:     General: Normal vulva.      Exam position: Lithotomy position.      " Labia:         Right: No tenderness or lesion.         Left: No tenderness or lesion.       Vagina: No vaginal discharge, tenderness, bleeding or lesions.      Cervix: No discharge, lesion, erythema or cervical bleeding.      Uterus: Normal. Not enlarged and not tender.       Adnexa:         Right: No mass, tenderness or fullness.          Left: No mass, tenderness or fullness.     Musculoskeletal:      Cervical back: Normal range of motion.   Lymphadenopathy:      Upper Body:      Right upper body: No axillary adenopathy.      Left upper body: No axillary adenopathy.   Skin:     General: Skin is warm and dry.   Neurological:      Mental Status: She is alert.   Psychiatric:         Mood and Affect: Mood normal.         Behavior: Behavior normal.         Judgment: Judgment normal.             Ladan Collado CNM  [facility]  01/18/24 1:04 PM

## 2024-01-18 ENCOUNTER — OFFICE VISIT (OUTPATIENT)
Dept: OBGYN CLINIC | Facility: MEDICAL CENTER | Age: 64
End: 2024-01-18
Payer: COMMERCIAL

## 2024-01-18 VITALS
BODY MASS INDEX: 32.15 KG/M2 | DIASTOLIC BLOOD PRESSURE: 86 MMHG | HEIGHT: 61 IN | SYSTOLIC BLOOD PRESSURE: 136 MMHG | WEIGHT: 170.3 LBS

## 2024-01-18 DIAGNOSIS — Z12.31 ENCOUNTER FOR SCREENING MAMMOGRAM FOR BREAST CANCER: ICD-10-CM

## 2024-01-18 DIAGNOSIS — Z12.4 CERVICAL CANCER SCREENING: ICD-10-CM

## 2024-01-18 DIAGNOSIS — Z01.419 ENCOUNTER FOR ANNUAL ROUTINE GYNECOLOGICAL EXAMINATION: Primary | ICD-10-CM

## 2024-01-18 PROCEDURE — 99386 PREV VISIT NEW AGE 40-64: CPT | Performed by: ADVANCED PRACTICE MIDWIFE

## 2024-01-18 PROCEDURE — G0145 SCR C/V CYTO,THINLAYER,RESCR: HCPCS | Performed by: ADVANCED PRACTICE MIDWIFE

## 2024-01-18 PROCEDURE — G0476 HPV COMBO ASSAY CA SCREEN: HCPCS | Performed by: ADVANCED PRACTICE MIDWIFE

## 2024-01-19 LAB
HPV HR 12 DNA CVX QL NAA+PROBE: NEGATIVE
HPV16 DNA CVX QL NAA+PROBE: NEGATIVE
HPV18 DNA CVX QL NAA+PROBE: NEGATIVE

## 2024-01-23 DIAGNOSIS — D12.6 SESSILE SERRATED POLYP OF COLON: ICD-10-CM

## 2024-01-23 DIAGNOSIS — R10.13 EPIGASTRIC PAIN: Primary | ICD-10-CM

## 2024-01-23 LAB
LAB AP GYN PRIMARY INTERPRETATION: NORMAL
Lab: NORMAL

## 2024-01-24 ENCOUNTER — TELEPHONE (OUTPATIENT)
Dept: GASTROENTEROLOGY | Facility: MEDICAL CENTER | Age: 64
End: 2024-01-24

## 2024-01-24 ENCOUNTER — HOSPITAL ENCOUNTER (OUTPATIENT)
Dept: MAMMOGRAPHY | Facility: MEDICAL CENTER | Age: 64
Discharge: HOME | End: 2024-01-24
Payer: COMMERCIAL

## 2024-01-24 ENCOUNTER — CONSULT (OUTPATIENT)
Dept: GASTROENTEROLOGY | Facility: MEDICAL CENTER | Age: 64
End: 2024-01-24
Payer: COMMERCIAL

## 2024-01-24 VITALS — WEIGHT: 168.87 LBS | HEIGHT: 61 IN | BODY MASS INDEX: 31.88 KG/M2

## 2024-01-24 VITALS
HEIGHT: 61 IN | HEART RATE: 86 BPM | TEMPERATURE: 98.4 F | WEIGHT: 168.9 LBS | DIASTOLIC BLOOD PRESSURE: 74 MMHG | BODY MASS INDEX: 31.89 KG/M2 | SYSTOLIC BLOOD PRESSURE: 122 MMHG

## 2024-01-24 DIAGNOSIS — K76.0 FATTY LIVER: ICD-10-CM

## 2024-01-24 DIAGNOSIS — R10.13 EPIGASTRIC PAIN: Primary | ICD-10-CM

## 2024-01-24 DIAGNOSIS — Z86.010 HISTORY OF COLON POLYPS: ICD-10-CM

## 2024-01-24 DIAGNOSIS — Z12.31 ENCOUNTER FOR SCREENING MAMMOGRAM FOR BREAST CANCER: ICD-10-CM

## 2024-01-24 DIAGNOSIS — Z01.419 ENCOUNTER FOR ANNUAL ROUTINE GYNECOLOGICAL EXAMINATION: ICD-10-CM

## 2024-01-24 PROCEDURE — 77067 SCR MAMMO BI INCL CAD: CPT

## 2024-01-24 PROCEDURE — 99244 OFF/OP CNSLTJ NEW/EST MOD 40: CPT | Performed by: INTERNAL MEDICINE

## 2024-01-24 PROCEDURE — 77063 BREAST TOMOSYNTHESIS BI: CPT

## 2024-01-24 RX ORDER — FAMOTIDINE 20 MG/1
20 TABLET, FILM COATED ORAL 2 TIMES DAILY
Qty: 60 TABLET | Refills: 2 | Status: SHIPPED | OUTPATIENT
Start: 2024-01-24

## 2024-01-24 RX ORDER — OMEPRAZOLE 40 MG/1
40 CAPSULE, DELAYED RELEASE ORAL
Qty: 30 CAPSULE | Refills: 3 | Status: CANCELLED | OUTPATIENT
Start: 2024-01-24

## 2024-01-24 NOTE — TELEPHONE ENCOUNTER
ASC Screening    ASC Screening  BMI > than 45: No  Are you currently pregnant?: No  Do you rely on a wheelchair for mobility?: No  Do you need oxygen during the day?: No  Have you ever been informed by anesthesia that you have a difficult airway?: No  Have you been diagnosed with End Stage Renal Disease (ESRD)?: No  Are you actively on dialysis?: No  Have you been diagnosed with Pulmonary Hypertension?: No  Do you have a pacemaker or an Automatic Implantable Cardioverter Defibrillator (AICD)?: No  Have you ever had an organ transplant?: No  Have you had a stroke, heart attack, myocardial infarction (MI) within the last 6 months?: No  Have you ever been diagnosed with Aortic Stenosis?: No  Have you ever been diagnosed  with Congestive Heart Failure?: No  Have you ever been diagnosed with a heart valve disease?: No  Are you Diabetic?: No  If you are Diabetic, has your A1C been greater than 12 within the last six months?: N/A        Procedure: EGD/Colonoscopy  Date: 04/19/2024  Physician performing: Dr. Jaiyeola  Location of procedure:  [facility]  Instructions given to patient: Yes  Diabetic: No  Clearances: N/A    Alert and oriented to person, place and time

## 2024-01-24 NOTE — PROGRESS NOTES
[facility] - Outpatient Consultation  Shanna Parker 63 y.o. female MRN: [id number]  Encounter: [id number]          ASSESSMENT AND PLAN:  63-year-old female with history of hypothyroidism who presents for evaluation    1. Epigastric pain  She has had periodic episodes of right upper quadrant and epigastric Dre pain for the past several months.  She underwent an abdominal ultrasound showing cholelithiasis and gallbladder adenomyomatosis and ultimately underwent cholecystectomy in December.  After her cholecystectomy she still had intermittent episodes of more localized epigastric abdominal pain.  We discussed the differential diagnosis includes esophagitis, peptic ulcer disease.  I recommend resuming famotidine as needed and if symptoms persist can start daily PPI trial.  She will be scheduled for diagnostic EGD for further evaluation.  If her endoscopy is unremarkable she would benefit from CT scan for further evaluation  - Ambulatory Referral to Gastroenterology  - famotidine (PEPCID) 20 mg tablet; Take 1 tablet (20 mg total) by mouth 2 (two) times a day  Dispense: 60 tablet; Refill: 2  - EGD; Future    2. History of colon polyps  Her last colonoscopy in 2017 showed 2 polyps with pathology noting sessile serrated lesion and tubular adenoma.  She is due for surveillance exam  - Ambulatory Referral to Gastroenterology  - Colonoscopy; Future      3. Fatty liver  Her abdominal ultrasound showed hepatic steatosis.  This is likely related to nonalcoholic fatty liver disease given her elevated BMI.  Treatment includes healthy diet, regular exercise, limiting alcohol use.  Her LFTs are normal and she has no stigmata of chronic liver disease on exam    Follow-up after the procedures      ______________________________________________________________________    HPI: 63-year-old female with history of hypothyroidism who presents for evaluation.      She reports new onset of right upper quadrant  "abdominal pain starting in July the pain would usually occur at night and could radiate to her epigastrium.  The pain would last a few hours and self resolved.  She ultimately underwent an abdominal ultrasound showing cholelithiasis and gallbladder adenomyomatosis in addition to mild fatty liver.  She then underwent cholecystectomy in December.  After cholecystectomy she still had intermittent episodes of now localized epigastric abdominal pain.  She denies reflux symptoms, sour taste in her mouth or chronic cough.  In the past she had used famotidine with some relief of her symptoms.  She denies frequent NSAID use    She underwent colonoscopy in 2017 showing 2 \"medium\" sized polyps.  Pathology showed sessile serrated lesion and tubular adenomas.  She was recommended repeat in 5 years    She has a family history of her mother diagnosed with cholangiocarcinoma in her late 70s  Her past surgical history includes tubal ligation and cholecystectomy      She has no recent abdominal imaging available for review  12/2023 liver enzymes are within normal limits other than mildly elevated alkaline phosphatase at 106, hemoglobin 14, platelets 262      REVIEW OF SYSTEMS:    CONSTITUTIONAL: Denies any fever, chills, rigors, and weight loss.  HEENT: No earache or tinnitus. Denies hearing loss or visual disturbances.  CARDIOVASCULAR: No chest pain or palpitations.   RESPIRATORY: Denies any cough, hemoptysis, shortness of breath or dyspnea on exertion.  GASTROINTESTINAL: As noted in the History of Present Illness.   GENITOURINARY: No problems with urination. Denies any hematuria or dysuria.  NEUROLOGIC: No dizziness or vertigo, denies headaches.   MUSCULOSKELETAL: Denies any muscle or joint pain.   SKIN: Denies skin rashes or itching.   ENDOCRINE: Denies excessive thirst. Denies intolerance to heat or cold.  PSYCHOSOCIAL: Denies depression or anxiety. Denies any recent memory loss.       Historical Information   Past Medical " History:   Diagnosis Date    Abnormal glucose     last assessed: 09/08/16    Abnormal weight gain     last assessed: 07/10/13    Arthritis 11/21    Candidal vulvovaginitis     last assessed: 03/11/14    Cervical polyp     last assessed: 03/11/14    COVID 2020    Disease of thyroid gland     hypothyroidism     Eczema     RLE    Hot flashes due to menopause     Spontaneous vaginal delivery     history of two vaginal deliveries    Vitamin D deficiency     last assessed: 09/08/16     Past Surgical History:   Procedure Laterality Date    CHOLECYSTECTOMY      DENTAL SURGERY      FOOT SURGERY Right 2002    bunion    ND COLONOSCOPY FLX DX W/COLLJ SPEC WHEN PFRMD N/A 05/12/2017    Procedure: COLONOSCOPY;  Surgeon: Don Franco MD;  [facility];  Service: Gastroenterology    ND LAPAROSCOPY SURG CHOLECYSTECTOMY N/A 12/28/2023    Procedure: CHOLECYSTECTOMY  W/ ROBOT;  Surgeon: Jeff Graham MD;  Location[facility];  Service: General    ROTATOR CUFF REPAIR  2017    Spet, [facility] [place]    TUBAL LIGATION       Social History   Social History     Substance and Sexual Activity   Alcohol Use Not Currently    Comment: I do not indulge     Social History     Substance and Sexual Activity   Drug Use No     Social History     Tobacco Use   Smoking Status Former   Smokeless Tobacco Never     Family History   Problem Relation Age of Onset    Hypertension Mother         benign essential    Cancer Mother 78        pacreas related    Hypertension Father         benign essential    Migraines Sister     No Known Problems Maternal Grandmother     No Known Problems Maternal Grandfather     No Known Problems Paternal Grandmother     No Known Problems Paternal Grandfather     No Known Problems Maternal Aunt     No Known Problems Paternal Aunt     No Known Problems Paternal Aunt     No Known Problems Paternal Aunt        Meds/Allergies       Current Outpatient Medications:     levothyroxine 175 mcg tablet    Allergies   Allergen Reactions  "   Codeine Other (See Comments), GI Intolerance and Vomiting     Headache      Eggs Or Egg-Derived Products - Food Allergy     Kiwi Extract - Food Allergy      Mouth swelling    Latex Other (See Comments) and GI Intolerance     Gagging           Objective     Blood pressure 122/74, pulse 86, temperature 98.4 °F (36.9 °C), temperature source Tympanic, height 5' 1\" (1.549 m), weight 76.6 kg (168 lb 14.4 oz), last menstrual period 02/28/2018, not currently breastfeeding. Body mass index is 31.91 kg/m².        PHYSICAL EXAM:      General Appearance:   Alert, cooperative, no distress   HEENT:   Normocephalic, atraumatic, anicteric.     Neck:  Supple, symmetrical, trachea midline   Lungs:   Clear to auscultation bilaterally; no rales, rhonchi or wheezing; respirations unlabored    Heart::   Regular rate and rhythm; no murmur, rub, or gallop.   Abdomen:   Soft, mild epigastric tenderness to deep palpation without rebound or guarding, well-healing cholecystectomy scars non-distended; normal bowel sounds; no masses, no organomegaly    Genitalia:   Deferred    Rectal:   Deferred    Extremities:  No cyanosis, clubbing or edema    Pulses:  2+ and symmetric    Skin:  No jaundice, rashes, or lesions    Lymph nodes:  No palpable cervical lymphadenopathy        Lab Results:   No visits with results within 1 Day(s) from this visit.   Latest known visit with results is:   Office Visit on 01/18/2024   Component Date Value    Case Report 01/18/2024                      Value:Gynecologic Cytology Report                       Case: [id number]                                  Authorizing Provider:  Ladan Collado CNM             Collected:           01/18/2024 1221              [facility]ing Location[facility]     [facility]  Received:            01/18/2024 1221                                     [facility]                                                           First Screen:          Mya Silverio                                  "                               Specimen:    LIQUID-BASED PAP, SCREENING, Cervix, Endocervical                                          Primary Interpretation 01/18/2024 Negative for intraepithelial lesion or malignancy     Specimen Adequacy 01/18/2024 Satisfactory for evaluation. Endocervical/transformation zone component present.     Additional Information 01/18/2024                      Value:This result contains rich text formatting which cannot be displayed here.    HPV Other HR 01/18/2024 Negative     HPV16 01/18/2024 Negative     HPV18 01/18/2024 Negative          Radiology Results:   No results found.

## 2024-03-07 ENCOUNTER — TELEPHONE (OUTPATIENT)
Age: 64
End: 2024-03-07

## 2024-03-07 NOTE — TELEPHONE ENCOUNTER
Patients GI provider:  Dr. Jaiyeola    Number to return call: [phone]    Reason for call: Pt calling to provide updated insurance info to receive prior auth for upcoming colonoscopy/EGD. Insurance info updated. Please reach out to new insurance company to verify proced will be covered.    Scheduled procedure/appointment date if applicable: Procedure 04/19/2024

## 2024-03-07 NOTE — TELEPHONE ENCOUNTER
Patient calling to give updated insurance info however there was an issue with her phone line. Call then went silent

## 2024-04-02 ENCOUNTER — TELEPHONE (OUTPATIENT)
Dept: GASTROENTEROLOGY | Facility: MEDICAL CENTER | Age: 64
End: 2024-04-02

## 2024-04-02 ENCOUNTER — TELEPHONE (OUTPATIENT)
Age: 64
End: 2024-04-02

## 2024-04-05 ENCOUNTER — ANESTHESIA (OUTPATIENT)
Dept: ANESTHESIOLOGY | Facility: HOSPITAL | Age: 64
End: 2024-04-05

## 2024-04-05 ENCOUNTER — ANESTHESIA EVENT (OUTPATIENT)
Dept: ANESTHESIOLOGY | Facility: HOSPITAL | Age: 64
End: 2024-04-05

## 2024-04-17 ENCOUNTER — TELEPHONE (OUTPATIENT)
Dept: GASTROENTEROLOGY | Facility: MEDICAL CENTER | Age: 64
End: 2024-04-17

## 2024-04-18 RX ORDER — SODIUM CHLORIDE 9 MG/ML
125 INJECTION, SOLUTION INTRAVENOUS CONTINUOUS
Status: CANCELLED | OUTPATIENT
Start: 2024-04-18

## 2024-04-19 ENCOUNTER — HOSPITAL ENCOUNTER (OUTPATIENT)
Dept: GASTROENTEROLOGY | Facility: MEDICAL CENTER | Age: 64
Setting detail: OUTPATIENT SURGERY
Discharge: HOME | End: 2024-04-19
Payer: COMMERCIAL

## 2024-04-19 ENCOUNTER — ANESTHESIA (OUTPATIENT)
Dept: GASTROENTEROLOGY | Facility: MEDICAL CENTER | Age: 64
End: 2024-04-19

## 2024-04-19 ENCOUNTER — ANESTHESIA EVENT (OUTPATIENT)
Dept: GASTROENTEROLOGY | Facility: MEDICAL CENTER | Age: 64
End: 2024-04-19

## 2024-04-19 VITALS
WEIGHT: 168 LBS | HEIGHT: 61 IN | BODY MASS INDEX: 31.72 KG/M2 | OXYGEN SATURATION: 100 % | HEART RATE: 77 BPM | RESPIRATION RATE: 16 BRPM | DIASTOLIC BLOOD PRESSURE: 65 MMHG | SYSTOLIC BLOOD PRESSURE: 143 MMHG | TEMPERATURE: 98 F

## 2024-04-19 DIAGNOSIS — R10.13 EPIGASTRIC PAIN: ICD-10-CM

## 2024-04-19 DIAGNOSIS — Z86.010 HISTORY OF COLON POLYPS: ICD-10-CM

## 2024-04-19 PROCEDURE — 88305 TISSUE EXAM BY PATHOLOGIST: CPT | Performed by: PATHOLOGY

## 2024-04-19 PROCEDURE — 45385 COLONOSCOPY W/LESION REMOVAL: CPT | Performed by: INTERNAL MEDICINE

## 2024-04-19 PROCEDURE — 43239 EGD BIOPSY SINGLE/MULTIPLE: CPT | Performed by: INTERNAL MEDICINE

## 2024-04-19 RX ORDER — PROPOFOL 10 MG/ML
INJECTION, EMULSION INTRAVENOUS AS NEEDED
Status: DISCONTINUED | OUTPATIENT
Start: 2024-04-19 | End: 2024-04-19

## 2024-04-19 RX ORDER — SODIUM CHLORIDE 9 MG/ML
125 INJECTION, SOLUTION INTRAVENOUS CONTINUOUS
Status: DISCONTINUED | OUTPATIENT
Start: 2024-04-19 | End: 2024-04-23 | Stop reason: HOSPADM

## 2024-04-19 RX ORDER — LIDOCAINE HYDROCHLORIDE 20 MG/ML
INJECTION, SOLUTION EPIDURAL; INFILTRATION; INTRACAUDAL; PERINEURAL AS NEEDED
Status: DISCONTINUED | OUTPATIENT
Start: 2024-04-19 | End: 2024-04-19

## 2024-04-19 RX ADMIN — Medication 40 MG: at 10:54

## 2024-04-19 RX ADMIN — LIDOCAINE HYDROCHLORIDE 5 MG: 20 INJECTION, SOLUTION EPIDURAL; INFILTRATION; INTRACAUDAL at 10:42

## 2024-04-19 RX ADMIN — PROPOFOL 130 MG: 10 INJECTION, EMULSION INTRAVENOUS at 10:42

## 2024-04-19 RX ADMIN — SODIUM CHLORIDE 125 ML/HR: 0.9 INJECTION, SOLUTION INTRAVENOUS at 10:36

## 2024-04-19 RX ADMIN — PROPOFOL 50 MG: 10 INJECTION, EMULSION INTRAVENOUS at 11:03

## 2024-04-19 RX ADMIN — PROPOFOL 50 MG: 10 INJECTION, EMULSION INTRAVENOUS at 10:50

## 2024-04-19 RX ADMIN — PROPOFOL 50 MG: 10 INJECTION, EMULSION INTRAVENOUS at 10:56

## 2024-04-19 NOTE — ANESTHESIA POSTPROCEDURE EVALUATION
"Post-Op Assessment Note    CV Status:  Stable    Pain management: adequate       Mental Status:  Alert and awake   Hydration Status:  Euvolemic   PONV Controlled:  Controlled   Airway Patency:  Patent     Post Op Vitals Reviewed: Yes    No anethesia notable event occurred.                BP      Temp      Pulse     Resp      SpO2      /65   Pulse 77   Temp 98 °F (36.7 °C) (Temporal)   Resp 16   Ht 5' 1\" (1.549 m)   Wt 76.2 kg (168 lb)   LMP 02/28/2018   SpO2 100%   BMI 31.74 kg/m²     "

## 2024-04-19 NOTE — ANESTHESIA PREPROCEDURE EVALUATION
Procedure:  COLONOSCOPY  EGD    Relevant Problems   CARDIO   (+) Primary hypertension   (+) Pure hypercholesterolemia      ENDO   (+) Hypothyroidism        Physical Exam    Airway    Mallampati score: II         Dental   No notable dental hx     Cardiovascular  Cardiovascular exam normal    Pulmonary  Pulmonary exam normal     Other Findings  post-pubertal.      Anesthesia Plan  ASA Score- 2     Anesthesia Type- IV sedation with anesthesia with ASA Monitors.         Additional Monitors:     Airway Plan:            Plan Factors-Exercise tolerance (METS): >4 METS.    Chart reviewed.        Patient is not a current smoker.  Patient instructed to abstain from smoking on day of procedure. Patient did not smoke on day of surgery.            Induction- intravenous.    Postoperative Plan-     Informed Consent- Anesthetic plan and risks discussed with patient.

## 2024-04-19 NOTE — H&P
H&P EXAM - Outpatient Endoscopy   Shanna Parker 63 y.o. female MRN: [id number]    [facility] ENDOSCOPY   Encounter: [id number]        History and Physical - [facility]  Shanna Parker 63 y.o. female MRN: [id number]                  HPI: Shanna Parker is a 63 y.o. year old female who presents for abdominal pain, history of colon polyps      REVIEW OF SYSTEMS: Per the HPI, and otherwise unremarkable.    Historical Information   Past Medical History:   Diagnosis Date    Abnormal glucose     last assessed: 09/08/16    Abnormal weight gain     last assessed: 07/10/13    Arthritis 11/21    Candidal vulvovaginitis     last assessed: 03/11/14    Cervical polyp     last assessed: 03/11/14    COVID 2020    Disease of thyroid gland     hypothyroidism     Eczema     RLE    Hot flashes due to menopause     Spontaneous vaginal delivery     history of two vaginal deliveries    Vitamin D deficiency     last assessed: 09/08/16     Past Surgical History:   Procedure Laterality Date    CHOLECYSTECTOMY      DENTAL SURGERY      FOOT SURGERY Right 2002    bunion    ME COLONOSCOPY FLX DX W/COLLJ SPEC WHEN PFRMD N/A 05/12/2017    Procedure: COLONOSCOPY;  Surgeon: Don Franco MD;  [facility];  Service: Gastroenterology    ME LAPAROSCOPY SURG CHOLECYSTECTOMY N/A 12/28/2023    Procedure: CHOLECYSTECTOMY  W/ ROBOT;  Surgeon: Jeff Graham MD;  [facility];  Service: General    ROTATOR CUFF REPAIR  2017    DEANGELO Eddy    TUBAL LIGATION       Social History   Social History     Substance and Sexual Activity   Alcohol Use Not Currently    Comment: I do not indulge     Social History     Substance and Sexual Activity   Drug Use No     Social History     Tobacco Use   Smoking Status Former   Smokeless Tobacco Never     Family History   Problem Relation Age of Onset    Hypertension Mother         benign essential    Cancer Mother 78        pacreas related    Hypertension Father          benign essential    Migraines Sister     No Known Problems Maternal Grandmother     No Known Problems Maternal Grandfather     No Known Problems Paternal Grandmother     No Known Problems Paternal Grandfather     No Known Problems Maternal Aunt     No Known Problems Paternal Aunt     No Known Problems Paternal Aunt     No Known Problems Paternal Aunt        Meds/Allergies     (Not in a hospital admission)      Allergies   Allergen Reactions    Codeine Other (See Comments), GI Intolerance and Vomiting     Headache      Eggs Or Egg-Derived Products - Food Allergy     Kiwi Extract - Food Allergy      Mouth swelling    Latex Other (See Comments) and GI Intolerance     Gagging       Objective     LMP 02/28/2018       PHYSICAL EXAM    Gen: NAD  CV: RRR  CHEST: Clear  ABD: soft, NT/ND  EXT: no edema      ASSESSMENT/PLAN:  This is a 63 y.o. year old female here for egd/colonoscopy, and she is stable and optimized for her procedure.

## 2024-04-23 PROCEDURE — 88305 TISSUE EXAM BY PATHOLOGIST: CPT | Performed by: PATHOLOGY

## 2024-04-23 NOTE — RESULT ENCOUNTER NOTE
Results relayed via Mychart  Subcentimeter tubular adenoma.  Repeat colonoscopy in 5 years.  Otherwise unremarkable pathology

## 2024-07-11 DIAGNOSIS — E03.9 ACQUIRED HYPOTHYROIDISM: ICD-10-CM

## 2024-07-11 RX ORDER — LEVOTHYROXINE SODIUM 175 UG/1
175 TABLET ORAL DAILY
Qty: 90 TABLET | Refills: 1 | Status: SHIPPED | OUTPATIENT
Start: 2024-07-11

## 2024-10-28 ENCOUNTER — TELEPHONE (OUTPATIENT)
Age: 64
End: 2024-10-28

## 2024-10-28 NOTE — TELEPHONE ENCOUNTER
Pt called and requested a lab order for TSH get entered into her chart for her, please advise. Thank you.

## 2024-10-30 DIAGNOSIS — E03.9 ACQUIRED HYPOTHYROIDISM: ICD-10-CM

## 2024-10-30 DIAGNOSIS — E78.00 PURE HYPERCHOLESTEROLEMIA: ICD-10-CM

## 2024-10-30 DIAGNOSIS — I10 PRIMARY HYPERTENSION: Primary | ICD-10-CM

## 2024-10-30 DIAGNOSIS — R73.03 PREDIABETES: ICD-10-CM

## 2024-11-05 ENCOUNTER — TELEPHONE (OUTPATIENT)
Dept: FAMILY MEDICINE CLINIC | Facility: CLINIC | Age: 64
End: 2024-11-05

## 2024-11-05 NOTE — TELEPHONE ENCOUNTER
Patient scheduled for annual with Dr. Mcclendon, prior Dr. Monzon patient. Please contact patient to let them aware Dr. Mcclendon is retiring. Happy to keep patient scheduled for annual with Dr. Mcclendon next week or we can reschedule to another office provider.

## 2024-11-06 NOTE — TELEPHONE ENCOUNTER
Pt will like to keep appt with Dr. Mcclendon and going further she said its ok to see either Dr. Calvo or Shanell. YOJANA

## 2024-11-11 ENCOUNTER — OFFICE VISIT (OUTPATIENT)
Dept: FAMILY MEDICINE CLINIC | Facility: CLINIC | Age: 64
End: 2024-11-11
Payer: COMMERCIAL

## 2024-11-11 VITALS
SYSTOLIC BLOOD PRESSURE: 142 MMHG | TEMPERATURE: 97.3 F | BODY MASS INDEX: 34.55 KG/M2 | HEART RATE: 85 BPM | DIASTOLIC BLOOD PRESSURE: 88 MMHG | OXYGEN SATURATION: 99 % | HEIGHT: 61 IN | WEIGHT: 183 LBS

## 2024-11-11 DIAGNOSIS — G56.03 CARPAL TUNNEL SYNDROME ON BOTH SIDES: ICD-10-CM

## 2024-11-11 DIAGNOSIS — R03.0 ELEVATED BLOOD-PRESSURE READING, WITHOUT DIAGNOSIS OF HYPERTENSION: ICD-10-CM

## 2024-11-11 DIAGNOSIS — Z00.00 ANNUAL PHYSICAL EXAM: Primary | ICD-10-CM

## 2024-11-11 DIAGNOSIS — E03.9 ACQUIRED HYPOTHYROIDISM: ICD-10-CM

## 2024-11-11 PROBLEM — I10 PRIMARY HYPERTENSION: Status: RESOLVED | Noted: 2023-01-12 | Resolved: 2024-11-11

## 2024-11-11 PROCEDURE — 99396 PREV VISIT EST AGE 40-64: CPT | Performed by: FAMILY MEDICINE

## 2024-11-11 NOTE — PATIENT INSTRUCTIONS
"Vitamin D 2000- IU     Routine physical for adults   The Basics   Written by the doctors and editors at [facility]   What is a physical? -- A physical is a routine visit, or \"check-up,\" with your doctor. You might also hear it called a \"wellness visit\" or \"preventive visit.\"  During each visit, the doctor will:   Ask about your physical and mental health   Ask about your habits, behaviors, and lifestyle   Do an exam   Give you vaccines if needed   Talk to you about any medicines you take   Give advice about your health   Answer your questions  Getting regular check-ups is an important part of taking care of your health. It can help your doctor find and treat any problems you have. But it's also important for preventing health problems.  A routine physical is different from a \"sick visit.\" A sick visit is when you see a doctor because of a health concern or problem. Since physicals are scheduled ahead of time, you can think about what you want to ask the doctor.  How often should I get a physical? -- It depends on your age and health. In general, for people age 21 years and older:   If you are younger than 50 years, you might be able to get a physical every 3 years.   If you are 50 years or older, your doctor might recommend a physical every year.  If you have an ongoing health condition, like diabetes or high blood pressure, your doctor will probably want to see you more often.  What happens during a physical? -- In general, each visit will include:   Physical exam - The doctor or nurse will check your height, weight, heart rate, and blood pressure. They will also look at your eyes and ears. They will ask about how you are feeling and whether you have any symptoms that bother you.   Medicines - It's a good idea to bring a list of all the medicines you take to each doctor visit. Your doctor will talk to you about your medicines and answer any questions. Tell them if you are having any side effects that bother you. You " "should also tell them if you are having trouble paying for any of your medicines.   Habits and behaviors - This includes:   Your diet   Your exercise habits   Whether you smoke, drink alcohol, or use drugs   Whether you are sexually active   Whether you feel safe at home  Your doctor will talk to you about things you can do to improve your health and lower your risk of health problems. They will also offer help and support. For example, if you want to quit smoking, they can give you advice and might prescribe medicines. If you want to improve your diet or get more physical activity, they can help you with this, too.   Lab tests, if needed - The tests you get will depend on your age and situation. For example, your doctor might want to check your:   Cholesterol   Blood sugar   Iron level   Vaccines - The recommended vaccines will depend on your age, health, and what vaccines you already had. Vaccines are very important because they can prevent certain serious or deadly infections.   Discussion of screening - \"Screening\" means checking for diseases or other health problems before they cause symptoms. Your doctor can recommend screening based on your age, risk, and preferences. This might include tests to check for:   Cancer, such as breast, prostate, cervical, ovarian, colorectal, prostate, lung, or skin cancer   Sexually transmitted infections, such as chlamydia and gonorrhea   Mental health conditions like depression and anxiety  Your doctor will talk to you about the different types of screening tests. They can help you decide which screenings to have. They can also explain what the results might mean.   Answering questions - The physical is a good time to ask the doctor or nurse questions about your health. If needed, they can refer you to other doctors or specialists, too.  Adults older than 65 years often need other care, too. As you get older, your doctor will talk to you about:   How to prevent falling at " home   Hearing or vision tests   Memory testing   How to take your medicines safely   Making sure that you have the help and support you need at home  All topics are updated as new evidence becomes available and our peer review process is complete.  This topic retrieved from [employer] on: May 02, 2024.  Topic 274671 Version 1.0  Release: 32.4.3 - C32.122  © 2024 UpToDate, Inc. and/or its affiliates. All rights reserved.  Consumer Information Use and Disclaimer   Disclaimer: This generalized information is a limited summary of diagnosis, treatment, and/or medication information. It is not meant to be comprehensive and should be used as a tool to help the user understand and/or assess potential diagnostic and treatment options. It does NOT include all information about conditions, treatments, medications, side effects, or risks that may apply to a specific patient. It is not intended to be medical advice or a substitute for the medical advice, diagnosis, or treatment of a health care provider based on the health care provider's examination and assessment of a patient's specific and unique circumstances. Patients must speak with a health care provider for complete information about their health, medical questions, and treatment options, including any risks or benefits regarding use of medications. This information does not endorse any treatments or medications as safe, effective, or approved for treating a specific patient. UpToDate, Inc. and its affiliates disclaim any warranty or liability relating to this information or the use thereof.The use of this information is governed by the Terms of Use, available at https://www.woltersDeviceAuthorityuwer.com/en/know/clinical-effectiveness-terms. 2024© UpToDate, Inc. and its affiliates and/or licensors. All rights reserved.  Copyright   © 2024 UpToDate, Inc. and/or its affiliates. All rights reserved.

## 2024-11-11 NOTE — PROGRESS NOTES
Adult Annual Physical  Name: Shnana Parker      : 1960      MRN: [id number]  Encounter Provider: Tosha Dunaway DO  Encounter Date: 2024   Encounter department: [facility]    Assessment & Plan  Annual physical exam         Elevated blood-pressure reading, without diagnosis of hypertension  Monitor BP       Acquired hypothyroidism  Labs due       Carpal tunnel syndrome on both sides  Splint , NSAID topical and update depending on progress  Orders:  •  Ambulatory Referral to Orthopedic Surgery; Future      Immunizations and preventive care screenings were discussed with patient today. Appropriate education was printed on patient's after visit summary.    Counseling:  Alcohol/drug use: discussed moderation in alcohol intake, the recommendations for healthy alcohol use  Dental Health: discussed importance of regular tooth brushing, flossing, and dental visits.  Injury prevention: discussed safety/seat belts, safety helmets, smoke detectors, carbon monoxide detectors  Sexual health:   OB History        3    Para   2    Term   2            AB   1    Living   2       SAB   1    IAB        Ectopic        Multiple        Live Births   2                Exercise: the importance of regular exercise/physical activity was discussed. Recommend exercise 3-5 times per week for at least 30 minutes.          History of Present Illness   Chief Complaint   Patient presents with   • Physical Exam     Discuss heel pain started about 2 weeks ago with some swelling in the evening   Finger tips have been numb since having colonoscopy       Adult Annual Physical:  Patient presents for annual physical.     Diet and Physical Activity:  - Diet/Nutrition: well balanced diet.  - Exercise: no formal exercise and walking. 3 times a day    Depression Screening:    - PHQ-9 Score: 0    General Health:  - Sleep: sleeps well. gets up to pee  - Hearing: normal hearing bilateral ears.  - Vision: most  "recent eye exam < 1 year ago.  - Dental: regular dental visits.    /GYN Health:  - Follows with GYN: yes.   - Menopause: postmenopausal.   - History of STDs: no     Sees DERM     Review of Systems   Musculoskeletal:  Positive for arthralgias (Carpal tunnel?).        Heel issues   All other systems reviewed and are negative.        Objective     /88   Pulse 85   Temp (!) 97.3 °F (36.3 °C) (Temporal)   Ht 5' 1\" (1.549 m)   Wt 83 kg (183 lb)   LMP 02/28/2018   SpO2 99%   BMI 34.58 kg/m²     Physical Exam  Vitals reviewed.   Constitutional:       Appearance: Normal appearance.   HENT:      Right Ear: Tympanic membrane normal.      Left Ear: Tympanic membrane normal.      Nose: Nose normal.      Mouth/Throat:      Mouth: Mucous membranes are moist.   Cardiovascular:      Rate and Rhythm: Normal rate and regular rhythm.   Pulmonary:      Effort: Pulmonary effort is normal.      Breath sounds: Normal breath sounds.   Abdominal:      General: Bowel sounds are normal.      Palpations: Abdomen is soft.   Musculoskeletal:      Right lower leg: No edema.      Left lower leg: No edema.   Skin:     Findings: No rash.   Neurological:      Mental Status: She is alert and oriented to person, place, and time.   Psychiatric:         Mood and Affect: Mood normal.         Behavior: Behavior normal.         Thought Content: Thought content normal.         Judgment: Judgment normal.         "

## 2024-11-18 ENCOUNTER — APPOINTMENT (OUTPATIENT)
Dept: LAB | Facility: HOSPITAL | Age: 64
End: 2024-11-18
Payer: COMMERCIAL

## 2024-11-18 DIAGNOSIS — E03.9 ACQUIRED HYPOTHYROIDISM: ICD-10-CM

## 2024-11-18 LAB
CHOLEST SERPL-MCNC: 207 MG/DL (ref ?–200)
HDLC SERPL-MCNC: 60 MG/DL
LDLC SERPL CALC-MCNC: 103 MG/DL (ref 0–100)
TRIGL SERPL-MCNC: 219 MG/DL (ref ?–150)
TSH SERPL DL<=0.05 MIU/L-ACNC: 8.43 UIU/ML (ref 0.45–4.5)

## 2024-11-18 PROCEDURE — 84443 ASSAY THYROID STIM HORMONE: CPT

## 2024-11-29 ENCOUNTER — TELEPHONE (OUTPATIENT)
Age: 64
End: 2024-11-29

## 2024-11-29 NOTE — TELEPHONE ENCOUNTER
Patient called in regards to her TSH results and would like provider to review and give her some advice. Patient stated that she will not like to increase her medication due to having hair loss last time and would like to know if adding iron supplements will help her thyroid.

## 2024-12-02 ENCOUNTER — RESULTS FOLLOW-UP (OUTPATIENT)
Dept: FAMILY MEDICINE CLINIC | Facility: CLINIC | Age: 64
End: 2024-12-02

## 2024-12-02 NOTE — TELEPHONE ENCOUNTER
Patient called and was advised of the above message. Patient states she always takes to medication on an empty stomach and when she had gotten the TSH lab done it had been after work. Patient stated she normally goes in the morning while fasting. Patient is wondering what next steps would be in order to get this level down. Patient stated she does not want to increase medication dosage. Please advise.

## 2024-12-03 NOTE — TELEPHONE ENCOUNTER
Spoke with patient she would like to try a small dose of T3 with her levothyroxine.  She says every day around 2 pm her eyes get very heavy and she gets tired. She is hoping that would help with these symptoms also.

## 2024-12-05 DIAGNOSIS — E03.9 ACQUIRED HYPOTHYROIDISM: Primary | ICD-10-CM

## 2024-12-05 RX ORDER — LIOTHYRONINE SODIUM 5 UG/1
5 TABLET ORAL DAILY
Qty: 30 TABLET | Refills: 1 | Status: SHIPPED | OUTPATIENT
Start: 2024-12-05

## 2024-12-06 ENCOUNTER — TELEPHONE (OUTPATIENT)
Age: 64
End: 2024-12-06

## 2024-12-06 NOTE — TELEPHONE ENCOUNTER
Patient calls to reply that she is compliant levothyroxine. She takes the medication on an empty stomach with just a sip of water and waits 1 hour before eating/drinking. Patient is asking if she can take both the levothyroxine and the liothyronine together? Patient reports that PCP can respond with a message in the thu and the patient will check the thu at lunch. Patient can be reached @ [phone].

## 2025-01-02 ENCOUNTER — TELEPHONE (OUTPATIENT)
Age: 65
End: 2025-01-02

## 2025-01-02 DIAGNOSIS — R73.03 PREDIABETES: Primary | ICD-10-CM

## 2025-01-02 DIAGNOSIS — E03.9 ACQUIRED HYPOTHYROIDISM: ICD-10-CM

## 2025-01-02 RX ORDER — LIOTHYRONINE SODIUM 5 UG/1
5 TABLET ORAL DAILY
Qty: 30 TABLET | Refills: 1 | Status: SHIPPED | OUTPATIENT
Start: 2025-01-02

## 2025-01-02 NOTE — TELEPHONE ENCOUNTER
The repeat thyroid parameters were to be 6 weeks after starting the liothyronine which I believe she started in early December.  I will place the orders in the chart for her to have it done next week.  It is nonfasting.  The only other additional lab work she needs are chemistry profile.  I will place that order as well.

## 2025-01-02 NOTE — TELEPHONE ENCOUNTER
Patient asking when she should get the T3 and T4 blood work that's ordered for her. She also is asking if there are any other labs she needs for her thyroid due to starting her new medication. She will need a refill of the   Liothyronine.

## 2025-01-09 DIAGNOSIS — E03.9 ACQUIRED HYPOTHYROIDISM: ICD-10-CM

## 2025-01-10 RX ORDER — LEVOTHYROXINE SODIUM 175 UG/1
175 TABLET ORAL DAILY
Qty: 90 TABLET | Refills: 1 | Status: SHIPPED | OUTPATIENT
Start: 2025-01-10

## 2025-01-22 ENCOUNTER — APPOINTMENT (OUTPATIENT)
Dept: LAB | Facility: HOSPITAL | Age: 65
End: 2025-01-22
Payer: COMMERCIAL

## 2025-01-22 DIAGNOSIS — E03.9 ACQUIRED HYPOTHYROIDISM: ICD-10-CM

## 2025-01-22 LAB
T3FREE SERPL-MCNC: 2.74 PG/ML (ref 2.5–3.9)
T4 FREE SERPL-MCNC: 1.1 NG/DL (ref 0.61–1.12)
TSH SERPL DL<=0.05 MIU/L-ACNC: 1.95 UIU/ML (ref 0.45–4.5)

## 2025-01-22 PROCEDURE — 84481 FREE ASSAY (FT-3): CPT

## 2025-01-22 PROCEDURE — 84443 ASSAY THYROID STIM HORMONE: CPT

## 2025-01-22 PROCEDURE — 84439 ASSAY OF FREE THYROXINE: CPT

## 2025-01-22 PROCEDURE — 36415 COLL VENOUS BLD VENIPUNCTURE: CPT

## 2025-01-25 ENCOUNTER — RESULTS FOLLOW-UP (OUTPATIENT)
Dept: FAMILY MEDICINE CLINIC | Facility: CLINIC | Age: 65
End: 2025-01-25

## 2025-01-25 NOTE — RESULT ENCOUNTER NOTE
The TSH has completely normalized  with the addition of low-dose Cytomel.  Is the patient clinically feeling improved?

## 2025-01-28 DIAGNOSIS — E03.9 ACQUIRED HYPOTHYROIDISM: ICD-10-CM

## 2025-01-28 RX ORDER — LIOTHYRONINE SODIUM 5 UG/1
5 TABLET ORAL DAILY
Qty: 30 TABLET | Refills: 0 | Status: SHIPPED | OUTPATIENT
Start: 2025-01-28

## 2025-01-28 NOTE — TELEPHONE ENCOUNTER
Not a duplicate  Patient would like a 90 day supply sent in.    Reason for call:   [x] Refill   [] Prior Auth  [] Other:     Office:   [x] PCP/Provider - Tosha Dunaway, DO   [] Specialty/Provider -     Medication: liothyronine (CYTOMEL) 5 mcg tablet     Dose/Frequency: Take 1 tablet (5 mcg total) by mouth daily     Quantity: 90    Pharmacy: [facility] [address] [place] [address]     Does the patient have enough for 3 days?   [x] Yes   [] No - Send as HP to POD

## 2025-01-29 ENCOUNTER — TELEMEDICINE (OUTPATIENT)
Dept: FAMILY MEDICINE CLINIC | Facility: CLINIC | Age: 65
End: 2025-01-29
Payer: COMMERCIAL

## 2025-01-29 DIAGNOSIS — U07.1 COVID-19: Primary | ICD-10-CM

## 2025-01-29 PROCEDURE — 99213 OFFICE O/P EST LOW 20 MIN: CPT | Performed by: FAMILY MEDICINE

## 2025-01-29 RX ORDER — NIRMATRELVIR AND RITONAVIR 300-100 MG
3 KIT ORAL 2 TIMES DAILY
Qty: 30 TABLET | Refills: 0 | Status: SHIPPED | OUTPATIENT
Start: 2025-01-29 | End: 2025-02-03

## 2025-01-29 NOTE — PROGRESS NOTES
COVID-19 Outpatient Progress Note  Name: Shanna Parker      : 1960      MRN: [id number]  Encounter Provider: Conor Almeida MD  Encounter Date: 2025   Encounter department: [facility]    Assessment & Plan  COVID-19    Orders:  •  nirmatrelvir & ritonavir (Paxlovid, 300/100,) tablet therapy pack; Take 3 tablets by mouth 2 (two) times a day for 5 days Take 2 nirmatrelvir tablets + 1 ritonavir tablet together per dose    Disposition:   Mild symptoms.  She stated Paxlovid.  Sent to pharmacy.  Increase p.o. hydration.  RTC as needed    Patient meets criteria for Paxlovid and they have been counseled appropriately regarding risks, benefits, side effects, and alternative treatment options. After discussion, patient agrees to treatment.    Possible side effects of Paxlovid?    Possible side effects of Paxlovid are:  - Liver Problems. Notify us right away if you start to experience loss of appetite, yellowing of your skin and the whites of eyes (jaundice), dark-colored urine, pale colored stools and itchy skin, stomach area (abdominal) pain.  - Resistance to HIV Medicines. If you have untreated HIV infection, Paxlovid may lead to some HIV medicines not working as well in the future.  - Other possible side effects include: altered sense of taste, diarrhea, high blood pressure, or muscle aches.    I have spent a total time of 15 minutes on the day of the encounter for this patient including risks and benefits of treatment options and instructions for management.          Encounter provider: Conor Almeida MD     Provider located at: [facility]  [address] [place] [address]  [address]  [facility] [address] [phone]     Recent Visits  No visits were found meeting these conditions.  Showing recent visits within past 7 days and meeting all other requirements  Today's Visits  Date Type Provider Dept   25 Telemedicine Conor Almeida MD [facility]  Primary Care   Showing today's visits and meeting all other requirements  Future Appointments  No visits were found meeting these conditions.  Showing future appointments within next 150 days and meeting all other requirements    History of Present Illness      This virtual check-in was done via telephone and she agrees to proceed.    Patient agrees to participate in a virtual check in via telephone or video visit instead of presenting to the office to address urgent/immediate medical needs. Patient is aware this is a billable service. She acknowledged consent and understanding of privacy and security of the video platform. The patient has agreed to participate and understands they can discontinue the visit at any time.    After connecting through Telephone, the patient was identified by name and date of birth. Shanna Parker was informed that this was a telemedicine visit and that the exam was being conducted confidentially over secure lines. My office door was closed. No one else was in the room. Shanna Parker acknowledged consent and understanding of privacy and security of the telemedicine visit. I informed the patient that I have reviewed her record in Epic and presented the opportunity for her to ask any questions regarding the visit today. The patient agreed to participate.    It was my intent to perform this visit via video technology but the patient was not able to do a video connection so the visit was completed via audio telephone only.        Verification of patient location:  Patient is located in the following state in which I hold an active license: [place]    Subjective:   Shanna Parker is a 64 y.o. female who has been screened for COVID-19. Symptom change since last report: improving. Patient's symptoms include fever, cough and headache.     - Date of symptom onset: 1/27/2025  - Date of positive COVID-19 test: 1/28/2025. Type of test: Home antigen.     COVID-19 vaccination status: Fully vaccinated  (primary series)    Lab Results   Component Value Date    SARSCOV2 Not Detected 10/14/2020    SARSCORONAVI NOT DETECTED 09/24/2020       Review of Systems   Constitutional:  Positive for fever.   Respiratory:  Positive for cough.    Neurological:  Positive for headaches.     Objective   LMP 02/28/2018     Physical Exam  Pulmonary:      Effort: No respiratory distress.   Psychiatric:         Mood and Affect: Mood normal.         Behavior: Behavior normal.

## 2025-02-25 DIAGNOSIS — E03.9 ACQUIRED HYPOTHYROIDISM: ICD-10-CM

## 2025-02-25 NOTE — TELEPHONE ENCOUNTER
Patients insurance will cover 90 day supply send as 90 please     Reason for call:   [x] Refill   [] Prior Auth  [] Other:     Office:   [x] PCP/Provider - Tosha Dunaway  [] Specialty/Provider -     Medication: Liothyronine     Dose/Frequency: 5 mcg Daily     Quantity: 90    Pharmacy: [facility]    Does the patient have enough for 3 days?   [x] Yes   [] No - Send as HP to POD

## 2025-02-26 RX ORDER — LIOTHYRONINE SODIUM 5 UG/1
5 TABLET ORAL DAILY
Qty: 90 TABLET | Refills: 1 | Status: SHIPPED | OUTPATIENT
Start: 2025-02-26

## 2025-06-03 DIAGNOSIS — E03.9 ACQUIRED HYPOTHYROIDISM: ICD-10-CM

## 2025-06-04 RX ORDER — LEVOTHYROXINE SODIUM 175 UG/1
175 TABLET ORAL DAILY
Qty: 90 TABLET | Refills: 0 | Status: SHIPPED | OUTPATIENT
Start: 2025-06-04

## (undated) DEVICE — ARM DRAPE

## (undated) DEVICE — GLOVE SRG BIOGEL 6.5

## (undated) DEVICE — BLADELESS OBTURATOR: Brand: WECK VISTA

## (undated) DEVICE — CANNULA SEAL

## (undated) DEVICE — DRAPE SHEET X-LG

## (undated) DEVICE — GLOVE INDICATOR PI UNDERGLOVE SZ 6.5 BLUE

## (undated) DEVICE — SUT VICRYL 3-0 SH 27 IN J416H

## (undated) DEVICE — CADIERE FORCEPS: Brand: ENDOWRIST

## (undated) DEVICE — TISSUE RETRIEVAL SYSTEM: Brand: INZII RETRIEVAL SYSTEM

## (undated) DEVICE — TROCAR: Brand: KII SLEEVE

## (undated) DEVICE — CHLORAPREP HI-LITE 26ML ORANGE

## (undated) DEVICE — GLOVE SRG BIOGEL ECLIPSE 7.5

## (undated) DEVICE — MAYO STAND COVER: Brand: CONVERTORS

## (undated) DEVICE — PERMANENT CAUTERY HOOK: Brand: ENDOWRIST

## (undated) DEVICE — ASTOUND STANDARD SURGICAL GOWN, XL: Brand: CONVERTORS

## (undated) DEVICE — NEEDLE HYPO 22G X 1-1/2 IN

## (undated) DEVICE — SUT VICRYL 0 UR-6 27 IN J603H

## (undated) DEVICE — DISPOSABLE OR TOWEL: Brand: CARDINAL HEALTH

## (undated) DEVICE — HEM-O-LOK CLIP CARTRIDGE MED/LARGE DA VINCI SI/XI

## (undated) DEVICE — PENCIL ELECTROSURG E-Z CLEAN -0035H

## (undated) DEVICE — DRAPE LAPAROTOMY W/POUCHES

## (undated) DEVICE — COLUMN DRAPE

## (undated) DEVICE — IRRIG ENDO FLO TUBING

## (undated) DEVICE — ADHESIVE SKIN HIGH VISCOSITY EXOFIN 1ML

## (undated) DEVICE — PMI DISPOSABLE PUNCTURE CLOSURE DEVICE / SUTURE GRASPER: Brand: PMI

## (undated) DEVICE — SUT MONOCRYL 4-0 PS-2 27 IN Y426H

## (undated) DEVICE — MEDIUM-LARGE CLIP APPLIER: Brand: ENDOWRIST

## (undated) DEVICE — [HIGH FLOW INSUFFLATOR,  DO NOT USE IF PACKAGE IS DAMAGED,  KEEP DRY,  KEEP AWAY FROM SUNLIGHT,  PROTECT FROM HEAT AND RADIOACTIVE SOURCES.]: Brand: PNEUMOSURE

## (undated) DEVICE — SCD SEQUENTIAL COMPRESSION COMFORT SLEEVE MEDIUM KNEE LENGTH: Brand: KENDALL SCD

## (undated) DEVICE — DRAPE EQUIPMENT RF WAND

## (undated) DEVICE — STAPLER CANNULA SEAL: Brand: ENDOWRIST

## (undated) DEVICE — GLOVE INDICATOR PI UNDERGLOVE SZ 8 BLUE

## (undated) DEVICE — METZENBAUM ADTEC SINGLE USE DISSECTING SCISSORS, SHAFT ONLY, MONOPOLAR, CURVED TO LEFT, WORKING LENGTH: 12 1/4", (310 MM), DIAM. 5 MM, INSULATED, DOUBLE ACTION, STERILE, DISPOSABLE, PACKAGE OF 10 PIECES: Brand: AESCULAP

## (undated) DEVICE — 3000CC GUARDIAN II: Brand: GUARDIAN

## (undated) DEVICE — INTENDED FOR TISSUE SEPARATION, AND OTHER PROCEDURES THAT REQUIRE A SHARP SURGICAL BLADE TO PUNCTURE OR CUT.: Brand: BARD-PARKER SAFETY BLADES SIZE 11, STERILE

## (undated) DEVICE — ALLENTOWN LAP CHOLE APP PACK: Brand: CARDINAL HEALTH